# Patient Record
Sex: FEMALE | Race: WHITE | NOT HISPANIC OR LATINO | Employment: OTHER | URBAN - METROPOLITAN AREA
[De-identification: names, ages, dates, MRNs, and addresses within clinical notes are randomized per-mention and may not be internally consistent; named-entity substitution may affect disease eponyms.]

---

## 2017-01-19 ENCOUNTER — HOSPITAL ENCOUNTER (INPATIENT)
Facility: HOSPITAL | Age: 77
LOS: 2 days | Discharge: HOME | End: 2017-01-21
Attending: FAMILY MEDICINE | Admitting: INTERNAL MEDICINE

## 2017-01-19 ENCOUNTER — APPOINTMENT (OUTPATIENT)
Dept: GENERAL RADIOLOGY | Facility: HOSPITAL | Age: 77
End: 2017-01-19

## 2017-01-19 PROBLEM — I44.2 COMPLETE HEART BLOCK: Status: ACTIVE | Noted: 2017-01-19

## 2017-01-19 PROBLEM — J90 PLEURAL EFFUSION: Status: ACTIVE | Noted: 2017-01-19

## 2017-01-19 LAB — TROPONIN I SERPL-MCNC: <0.006 NG/ML

## 2017-01-19 PROCEDURE — 71020 HC CHEST PA AND LATERAL: CPT

## 2017-01-19 PROCEDURE — 84484 ASSAY OF TROPONIN QUANT: CPT | Performed by: FAMILY MEDICINE

## 2017-01-19 PROCEDURE — 99223 1ST HOSP IP/OBS HIGH 75: CPT | Performed by: FAMILY MEDICINE

## 2017-01-19 PROCEDURE — 93005 ELECTROCARDIOGRAM TRACING: CPT | Performed by: FAMILY MEDICINE

## 2017-01-19 RX ORDER — AMLODIPINE BESYLATE 2.5 MG/1
2.5 TABLET ORAL DAILY
Status: DISCONTINUED | OUTPATIENT
Start: 2017-01-20 | End: 2017-01-21 | Stop reason: HOSPADM

## 2017-01-19 RX ORDER — LATANOPROST 50 UG/ML
1 SOLUTION/ DROPS OPHTHALMIC NIGHTLY
COMMUNITY

## 2017-01-19 RX ORDER — ASPIRIN 81 MG/1
81 TABLET ORAL NIGHTLY
COMMUNITY

## 2017-01-19 RX ORDER — PANTOPRAZOLE SODIUM 40 MG/1
40 TABLET, DELAYED RELEASE ORAL
Status: DISCONTINUED | OUTPATIENT
Start: 2017-01-20 | End: 2017-01-21 | Stop reason: HOSPADM

## 2017-01-19 RX ORDER — ATORVASTATIN CALCIUM 20 MG/1
20 TABLET, FILM COATED ORAL DAILY
Status: DISCONTINUED | OUTPATIENT
Start: 2017-01-20 | End: 2017-01-20

## 2017-01-19 RX ORDER — LORAZEPAM 2 MG/ML
0.5 INJECTION INTRAMUSCULAR EVERY 6 HOURS PRN
Status: DISCONTINUED | OUTPATIENT
Start: 2017-01-19 | End: 2017-01-21 | Stop reason: HOSPADM

## 2017-01-19 RX ORDER — HYDRALAZINE HYDROCHLORIDE 20 MG/ML
10 INJECTION INTRAMUSCULAR; INTRAVENOUS EVERY 6 HOURS PRN
Status: DISCONTINUED | OUTPATIENT
Start: 2017-01-19 | End: 2017-01-21 | Stop reason: HOSPADM

## 2017-01-19 RX ORDER — SODIUM CHLORIDE 0.9 % (FLUSH) 0.9 %
1-10 SYRINGE (ML) INJECTION AS NEEDED
Status: DISCONTINUED | OUTPATIENT
Start: 2017-01-19 | End: 2017-01-21 | Stop reason: HOSPADM

## 2017-01-19 RX ORDER — ASPIRIN 81 MG/1
81 TABLET ORAL NIGHTLY
Status: DISCONTINUED | OUTPATIENT
Start: 2017-01-19 | End: 2017-01-21 | Stop reason: HOSPADM

## 2017-01-19 RX ORDER — LATANOPROST 50 UG/ML
1 SOLUTION/ DROPS OPHTHALMIC NIGHTLY
Status: DISCONTINUED | OUTPATIENT
Start: 2017-01-19 | End: 2017-01-21 | Stop reason: HOSPADM

## 2017-01-19 RX ORDER — LOSARTAN POTASSIUM 100 MG/1
100 TABLET ORAL DAILY
COMMUNITY
End: 2017-01-27 | Stop reason: ALTCHOICE

## 2017-01-19 RX ORDER — LEVOTHYROXINE SODIUM 88 UG/1
88 TABLET ORAL NIGHTLY
COMMUNITY
End: 2022-05-26 | Stop reason: ALTCHOICE

## 2017-01-19 RX ORDER — AMLODIPINE BESYLATE 2.5 MG/1
2.5 TABLET ORAL DAILY
COMMUNITY
End: 2017-01-27 | Stop reason: DRUGHIGH

## 2017-01-19 RX ORDER — ONDANSETRON 2 MG/ML
4 INJECTION INTRAMUSCULAR; INTRAVENOUS EVERY 6 HOURS PRN
Status: DISCONTINUED | OUTPATIENT
Start: 2017-01-19 | End: 2017-01-21 | Stop reason: HOSPADM

## 2017-01-19 RX ORDER — ACETAMINOPHEN 325 MG/1
650 TABLET ORAL EVERY 4 HOURS PRN
Status: DISCONTINUED | OUTPATIENT
Start: 2017-01-19 | End: 2017-01-21 | Stop reason: HOSPADM

## 2017-01-19 RX ORDER — HYDROCODONE BITARTRATE AND ACETAMINOPHEN 5; 325 MG/1; MG/1
1 TABLET ORAL EVERY 4 HOURS PRN
Status: DISCONTINUED | OUTPATIENT
Start: 2017-01-19 | End: 2017-01-21 | Stop reason: HOSPADM

## 2017-01-19 RX ORDER — SIMVASTATIN 40 MG
40 TABLET ORAL NIGHTLY
COMMUNITY

## 2017-01-19 RX ORDER — SODIUM CHLORIDE 9 MG/ML
100 INJECTION, SOLUTION INTRAVENOUS CONTINUOUS
Status: DISCONTINUED | OUTPATIENT
Start: 2017-01-19 | End: 2017-01-20

## 2017-01-19 RX ORDER — LOSARTAN POTASSIUM 50 MG/1
100 TABLET ORAL DAILY
Status: DISCONTINUED | OUTPATIENT
Start: 2017-01-20 | End: 2017-01-21 | Stop reason: HOSPADM

## 2017-01-19 RX ORDER — LEVOTHYROXINE SODIUM 88 UG/1
88 TABLET ORAL NIGHTLY
Status: DISCONTINUED | OUTPATIENT
Start: 2017-01-19 | End: 2017-01-21 | Stop reason: HOSPADM

## 2017-01-19 RX ADMIN — SODIUM CHLORIDE 100 ML/HR: 9 INJECTION, SOLUTION INTRAVENOUS at 21:58

## 2017-01-19 RX ADMIN — ASPIRIN 81 MG: 81 TABLET, COATED ORAL at 21:57

## 2017-01-19 RX ADMIN — LATANOPROST 1 DROP: 50 SOLUTION OPHTHALMIC at 21:57

## 2017-01-19 RX ADMIN — LEVOTHYROXINE SODIUM 88 MCG: 88 TABLET ORAL at 21:57

## 2017-01-19 NOTE — IP AVS SNAPSHOT
AFTER VISIT SUMMARY             Veronica Whatley           About your hospitalization     You were admitted on:  January 19, 2017 You last received care in the:  [address]       Procedures & Surgeries      Procedure(s) (LRB):  Pacemaker DC new (N/A)     1/19/2017 - 1/20/2017     Surgeon(s):  Ishan Melchor MD  -------------------      Medications    If you or your caregiver advised us that you are currently taking a medication and that medication is marked below as “Resume”, this simply indicates that we have reviewed those medications to make sure our new therapy recommendations do not interfere.  If you have concerns about medications other than those new ones which we are prescribing today, please consult the physician who prescribed them (or your primary physician).  Our review of your home medications is not meant to indicate that we are directing their use.             Your Medications      CONTINUE taking these medications     amitriptyline 50 MG tablet   Take 50 mg by mouth Every Night.   Commonly known as:  ELAVIL           amLODIPine 2.5 MG tablet   Take 2.5 mg by mouth Daily.   Last time this was given:  1/21/2017 10:04 AM   Commonly known as:  NORVASC           aspirin 81 MG EC tablet   Take 81 mg by mouth Every Night.   Last time this was given:  1/20/2017  8:12 PM           latanoprost 0.005 % ophthalmic solution   Administer 1 drop to both eyes Every Night.   Last time this was given:  1/20/2017  8:13 PM   Commonly known as:  XALATAN           levothyroxine 88 MCG tablet   Take 88 mcg by mouth Every Night.   Last time this was given:  1/21/2017 10:04 AM   Commonly known as:  SYNTHROID, LEVOTHROID           losartan 100 MG tablet   Take 100 mg by mouth Daily.   Last time this was given:  1/21/2017 12:55 PM   Commonly known as:  COZAAR           simvastatin 40 MG tablet   Take 40 mg by mouth Every Night.   Last time this was given:  1/20/2017  8:13 PM   Commonly known as:  ZOCOR                      Your Medications      Your Medication List           Morning Noon Evening Bedtime As Needed    amitriptyline 50 MG tablet   Take 50 mg by mouth Every Night.   Commonly known as:  ELAVIL                                   amLODIPine 2.5 MG tablet   Take 2.5 mg by mouth Daily.   Commonly known as:  NORVASC                                   aspirin 81 MG EC tablet   Take 81 mg by mouth Every Night.                                   latanoprost 0.005 % ophthalmic solution   Administer 1 drop to both eyes Every Night.   Commonly known as:  XALATAN                                   levothyroxine 88 MCG tablet   Take 88 mcg by mouth Every Night.   Commonly known as:  SYNTHROID, LEVOTHROID                                   losartan 100 MG tablet   Take 100 mg by mouth Daily.   Commonly known as:  COZAAR                                   simvastatin 40 MG tablet   Take 40 mg by mouth Every Night.   Commonly known as:  ZOCOR                                            Instructions for After Discharge        Discharge References/Attachments     THIRD DEGREE ATRIOVENTRICULAR BLOCK (ENGLISH)    PACEMAKER IMPLANTATION, CARE AFTER (ENGLISH)       Follow-ups for After Discharge        Referrals and Follow-ups to Schedule     Follow-Up    As directed    Needs wound check in 7-10 days with Dr Melchor           Additional information on Labs and Follow-ups:      Ishan Melchor  Cardiology  Cardiac Electrophysiology [phone] Phone [phone] Fax [phone] [phone]     Please call as soon as possible to make an appointment for a wound check in 7-10 days and a Pacemaker check in 10-12 weeks.              [employer] Signup     [facility] allows you to send messages to your doctor, view your test results, renew your prescriptions, schedule appointments, and more. To sign up, go to [employer] and click on the Sign Up Now link in the New User? box. Enter your  [email or web address] Activation Code exactly as it appears below along with the last four digits of your Social Security Number and your Date of Birth () to complete the sign-up process. If you do not sign up before the expiration date, you must request a new code.    [email or web address] Activation Code: [email or web address]  Expires: 2017 12:48 PM    If you have questions, you can email [email or web address] or call [phone] to talk to our [facility] staff. Remember, [employer] is NOT to be used for urgent needs. For medical emergencies, dial 911.           Summary of Your Hospitalization        Reason for Hospitalization     Your primary diagnosis was:  Complete Heart Block    Your diagnoses also included:  High Cholesterol Or Triglycerides, High Blood Pressure, Underactive Thyroid, Fluid In Pleural Cavity      Care Providers     Provider Service Role Specialty    Umm Galo II, DO Medicine Attending Provider Hospitalist    Ishan Melchor MD Cardiology Surgeon  Cardiology      Your Allergies  Date Reviewed: 2017    Allergen Reactions    Penicillins Rash    Rash and itching           Cantaloupe Extract Allergy Skin Test Not Noted         Cat Hair Extract Not Noted         Chicken Allergy Not Noted         Dog Epithelium Not Noted    hair         Eggs Or Egg-Derived Products Not Noted         Grass Not Noted         Horse-Derived Products Not Noted    Horse hair         Peanuts (Peanut Oil) Not Noted         Restoril (Temazepam) Hallucinations         Watermelon (Citrullus Vulgaris) Not Noted         Sulfa Antibiotics Rash      Pending Labs     Order Current Status    Urine Culture Preliminary result      Patient Belongings Returned     Document Return of Belongings Flowsheet     Were the patient bedside belongings sent home?   --   Belongings Retrieved from Security & Sent Home   --    Belongings Sent to Safe   --   Medications Retrieved from Pharmacy & Sent Home   --              More Information      Third-Degree  Atrioventricular Block  Third-degree atrioventricular (AV) block is a type of heart block.  About Heart Block  Heart block is a problem with the system that controls how often the heart beats (heart rate). If you have heart block, the electrical signals that regulate your heart rate are slowed or interrupted.  Normal Heart Action  The heart has two upper chambers (atria) and two lower chambers (ventricles). They work together to pump blood to the body. The heartbeat starts in an upper area of the right atrium (sinoatrial node, or SA node). This is the heart's natural pacemaker. The SA node sends electrical signals that pass through another node (atrioventricular node, or AV node), which is located between the atria and ventricles. Next, the signals travel through the ventricles on conduction pathways. As the signals pass down these pathways, the ventricles contract and send blood out to the body.  About Third-Degree AV Block  Third-degree heart block is the most serious type of AV block. Signals that control your heart rate are completely blocked. If you have third-degree AV block, the signals do not reach your ventricles. Your ventricles must contract on their own (escape beats). Escape beats are not enough to keep your heart working well. A slow heart rate may require an artificial pacemaker.  CAUSES  In some cases, a person is born with heart block. More often, the condition develops over time. Third-degree heart block may be caused by:  · Any condition that damages the heart's conducting system.  · Some medicines that slow down the heart rate.  RISK FACTORS  The risk for this condition increases with age. It is also more likely to develop in people who have any of the following conditions:  · A heart attack in the past.  · Heart failure.  · Coronary heart disease.  · Inflammation of heart muscle (myocarditis).  · Disease of heart muscle (cardiomyopathy).  · Infection of the heart valves  (endocarditis).  · Infections or diseases that affect the heart. These include:    Lyme disease.    Sarcoidosis.    Hemochromatosis.    Rheumatic fever.  SYMPTOMS  Symptoms of this condition may include:  · Fatigue.  · Shortness of breath.  · Dizziness or light-headedness.  · Fainting.  · Chest pain.  DIAGNOSIS  This condition may be diagnosed with:  · A physical exam. Your health care provider may determine that you have a very slow and irregular heartbeat or pulse.  · An electrocardiogram (ECG). This test is used to check for problems with the electrical activity in your heart.  TREATMENT  This condition requires immediate treatment in a hospital. Treatment may include:  · Continuous heart rhythm monitoring.  · Pacemaker placement. A pacemaker is a small, battery-powered device that is placed under the skin and is programmed to sense your heartbeats. If your heart rate is lower than the programmed rate, the pacemaker will pace your heart.    You may have a temporary pacemaker until a permanent pacemaker can be placed.  · Stopping or changing heart medicines that can slow your heart rate.  · Treating any medical conditions that may cause heart block.  HOME CARE INSTRUCTIONS  · If you get a permanent pacemaker, follow instructions from your health care provider about caring for and living with the device.  · Take over-the-counter and prescription medicines only as told by your health care provider.  · Work with your health care providers to control all your risks for heart disease. This may include following these instructions:    Get regular exercise. Ask your health care provider what type of exercise is safe for you.    Eat a heart-healthy diet. Your health care provider or dietitian can help you make healthy choices.    Maintain a healthy weight.    Do not use any tobacco products, including cigarettes, chewing tobacco, or e-cigarettes. If you need help quitting, ask your health care provider.    Limit alcohol  intake to no more than 1 drink per day for nonpregnant women and 2 drinks per day for men. One drink equals 12 oz of beer, 5 oz of wine, or 1½ oz of hard liquor.  · Keep all follow-up visits as told by your health care provider. This is important.  SEEK MEDICAL CARE IF:  · Your symptoms change or get worse.  · Your feel as though your heart is skipping beats.  · You feel more tired than normal.  · You have swelling in your lower legs.  SEEK IMMEDIATE MEDICAL CARE IF:  · You have chest pain, especially if the pain:    Feels like crushing or pressure.    Spreads to your arms, back, neck, or jaw.  · You feel short of breath.  · You feel light-headed or weak.  · You faint.     This information is not intended to replace advice given to you by your health care provider. Make sure you discuss any questions you have with your health care provider.     Document Released: 11/30/2009 Document Revised: 05/03/2016 Document Reviewed: 11/18/2015  [employer] Interactive Patient Education ©2016 [employer] Inc[employer]          Pacemaker Implantation, Care After  Refer to this sheet over the next few weeks. These instructions provide you with information on caring for yourself after the procedure. Your health care provider may also give you more specific instructions. Your treatment has been planned according to current medical practices, but problems sometimes occur. Call your health care provider if you have any problems or questions regarding your pacemaker.   WHAT TO EXPECT AFTER THE PROCEDURE  · You may feel pain. Some pain is normal. It may last a few days.  · A slight bump may be seen over the skin where the device was placed. Sometimes, it is possible to feel the device under the skin. This is normal.  · In the months and years afterward, your health care provider will check the device, the leads, and the battery every few months. Eventually, when the battery is low, the device will be replaced.  HOME CARE  INSTRUCTIONS  Medicines  · Take medicines only as directed by your health care provider.  · If you were prescribed an antibiotic medicine, finish it all even if you start to feel better.  · Do not take any other medicines without asking your health care provider first. Some medicines, including certain painkillers, can cause bleeding in your stomach after surgery.  Wound Care  · Do not remove the bandage on your chest until directed to do so by your health care provider.  · After your bandage is removed, you may see pieces of tape called skin adhesive strips over the area where the cut was made (incision site). Let them fall off on their own.  · Check the incision site every day to make sure it is not infected, bleeding, or starting to pull apart.  · Do not use lotions or ointments near the incision site unless directed to do so.  · Keep the incision area clean and dry for 2-3 days after the procedure or as directed by your health care provider. It takes several weeks for the incision site to completely heal.  · Do not take baths, swim, or use a hot tub until your health care provider approves.  Activities  · Try to walk a little every day. Exercising is important after this procedure. It is also important to use your shoulder on the side of the pacemaker in daily tasks that do not require exaggerated motion.  · Avoid sudden jerking, pulling, or chopping movements that pull your upper arm far away from your body for at least 6 weeks.  · Do not lift your upper arm above your shoulders for at least 6 weeks. This means no tennis, golf, or swimming for this period of time. If you sleep with the arm above your head, use a restraint to prevent this from happening as you sleep.  · You may go back to work when your health care provider says it is okay. Check with your health care provider before you start to drive or play sports.  Other Instructions  · Follow diet instructions if they were provided. You should be able to  eat what you usually do right away, but you may need to limit your salt intake.  · Weigh yourself every day. If you suddenly gain weight, fluid may be building up in your body.  · Always carry your pacemaker identification card with you. The card should list the implant date, device model, and . Consider wearing a medical alert bracelet or necklace.  · Tell all health care providers that you have a pacemaker. This may prevent them from giving you a magnetic resource imaging scan (MRI) because of the strong magnets used during that test.  · If you must pass through a metal detector, quickly walk through it. Do not stop under the detector or stand near it.  · Avoid places or objects with a strong electric or magnetic field, including:  ¨ Airport security aparicio. When at the airport, let officials know you have a pacemaker. Your ID card will let you be checked in a way that is safe for you and that will not damage your pacemaker. Also, do not let a security person wave a magnetic wand near your pacemaker. That can make it stop working.  ¨ Power plants.  ¨ Large electrical generators.  ¨ Radiofrequency transmission towers, such as cell phone and radio towers.  · Do not use amateur (ham) radio equipment or electric (arc) welding torches. Some devices are safe to use if held at least 1 foot from your pacemaker. These include power tools, lawn mowers, and speakers. If you are unsure of whether something is safe to use, ask your health care provider.  · You may safely use electric blankets, heating pads, computers, and microwave ovens.  · When using your cell phone, hold it to the ear opposite the pacemaker. Do not leave your cell phone in a pocket over the pacemaker.  · Keep all follow-up visits as directed by your health care provider. This is how your health care provider makes sure your chest is healing the way it should. Ask your health care provider when you should come back to have your stitches or staples  taken out.  · Have your pacemaker checked every 3-6 months or as directed by your health care provider. Most pacemakers last for 4-8 years before a new one is needed.  SEEK MEDICAL CARE IF:  · You gain weight suddenly.  · Your legs or feet swell more than they have before.  · It feels like your heart is fluttering or skipping beats (heart palpitations).  · You have a fever.  SEEK IMMEDIATE MEDICAL CARE IF:  · You have chest pain.  · You feel more short of breath than you have felt before.  · You feel more light-headed than you have felt before.  · You have problems with your incision site, such as swelling or bleeding, or it starts to open up.  · You have drainage, redness, swelling, or pain at your incision site.     This information is not intended to replace advice given to you by your health care provider. Make sure you discuss any questions you have with your health care provider.     Document Released: 07/07/2006 Document Revised: 01/08/2016 Document Reviewed: 04/19/2013  [employer] Interactive Patient Education ©2016 [employer].         PREVENTING SURGICAL SITE INFECTIONS     Surgical Site Infections FAQs  What is a Surgical Site Infection (SSI)?  A surgical site infection is an infection that occurs after surgery in the part of the body where the surgery took place. Most patients who have surgery do not develop an infection. However, infections develop in about 1 to 3 out of every 100 patients who have surgery.  Some of the common symptoms of a surgical site infection are:  · Redness and pain around the area where you had surgery  · Drainage of cloudy fluid from your surgical wound  · Fever  Can SSIs be treated?  Yes. Most surgical site infections can be treated with antibiotics. The antibiotic given to you depends on the bacteria (germs) causing the infection. Sometimes patients with SSIs also need another surgery to treat the infection.  What are some of the things that hospitals are doing to prevent  SSIs?  To prevent SSIs, doctors, nurses, and other healthcare providers:  · Clean their hands and arms up to their elbows with an antiseptic agent just before the surgery.  · Clean their hands with soap and water or an alcohol-based hand rub before and after caring for each patient.  · May remove some of your hair immediately before your surgery using electric clippers if the hair is in the same area where the procedure will occur. They should not shave you with a razor.  · Wear special hair covers, masks, gowns, and gloves during surgery to keep the surgery area clean.  · Give you antibiotics before your surgery starts. In most cases, you should get antibiotics within 60 minutes before the surgery starts and the antibiotics should be stopped within 24 hours after surgery.  · Clean the skin at the site of your surgery with a special soap that kills germs.  What can I do to help prevent SSIs?  Before your surgery:  · Tell your doctor about other medical problems you may have. Health problems such as allergies, diabetes, and obesity could affect your surgery and your treatment.  · Quit smoking. Patients who smoke get more infections. Talk to your doctor about how you can quit before your surgery.  · Do not shave near where you will have surgery. Shaving with a razor can irritate your skin and make it easier to develop an infection.  At the time of your surgery:  · Speak up if someone tries to shave you with a razor before surgery. Ask why you need to be shaved and talk with your surgeon if you have any concerns.  · Ask if you will get antibiotics before surgery.  After your surgery:  · Make sure that your healthcare providers clean their hands before examining you, either with soap and water or an alcohol-based hand rub.    If you do not see your providers clean their hands, please ask them to do so.  · Family and friends who visit you should not touch the surgical wound or dressings.  · Family and friends should  clean their hands with soap and water or an alcohol-based hand rub before and after visiting you. If you do not see them clean their hands, ask them to clean their hands.  What do I need to do when I go home from the hospital?  · Before you go home, your doctor or nurse should explain everything you need to know about taking care of your wound. Make sure you understand how to care for your wound before you leave the hospital.  · Always clean your hands before and after caring for your wound.  · Before you go home, make sure you know who to contact if you have questions or problems after you get home.  · If you have any symptoms of an infection, such as redness and pain at the surgery site, drainage, or fever, call your doctor immediately.  If you have additional questions, please ask your doctor or nurse.  Developed and co-sponsored by The Society for Healthcare Epidemiology of [place] (SHEA); Infectious Diseases Society of [place] (IDSA); American Hospital Association; Association for Professionals in Infection Control and Epidemiology (APIC); Centers for Disease Control and Prevention (CDC); and The Joint Commission.     This information is not intended to replace advice given to you by your health care provider. Make sure you discuss any questions you have with your health care provider.     Document Released: 12/23/2014 Document Revised: 01/08/2016 Document Reviewed: 03/02/2016  [employer] Interactive Patient Education ©2016 [employer]             SYMPTOMS OF A STROKE    Call 911 or have someone take you to the Emergency Department if you have any of the following:    · Sudden numbness or weakness of your face, arm or leg especially on one side of the body  · Sudden confusion, diffiiculty speaking or trouble understanding   · Changes in your vision or loss of sight in one eye  · Sudden severe headache with no known cause  · sudden dizziness, trouble walking, loss of balance or coordination    It is important to  seek emergency care right away if you have further stroke symptoms. If you get emergency help quickly, the powerful clot-dissolving medicines can reduce the disabilities caused by a stroke.     For more information:    American Stroke Association  1-181-7-STROKE  www.strokeassociation.org           IF YOU SMOKE OR USE TOBACCO PLEASE READ THE FOLLOWING:    Why is smoking bad for me?  Smoking increases the risk of heart disease, lung disease, vascular disease, stroke, and cancer.     If you smoke, STOP!    If you would like more information on quitting smoking, please visit the [employer] website: [email or web address]   This link will provide additional resources including the QUIT line and the Beat the Pack support groups.     For more information:    American Cancer Society  ([phone] 897-1679    American Heart Association  1-953.238.8060               YOU ARE THE MOST IMPORTANT FACTOR IN YOUR RECOVERY.     Follow all instructions carefully.     I have reviewed my discharge instructions with my nurse, including the following information, if applicable:     Information about my illness and diagnosis   Follow up appointments (including lab draws)   Wound Care   Equipment Needs   Medications (new and continuing) along with side effects   Preventative information such as vaccines and smoking cessations   Diet   Pain   I know when to contact my Doctor's office or seek emergency care      I want my nurse to describe the side effects of my medications: YES NO   If the answer is no, I understand the side effects of my medications: YES NO   My nurse described the side effects of my medications in a way that I could understand: YES NO   I have taken my personal belongings and my own medications with me at discharge: YES NO            I have received this information and my questions have been answered. I have discussed any concerns I see with this plan with the nurse or physician. I  understand these instructions.    Signature of Patient or Responsible Person: _____________________________________    Date: _________________  Time: __________________    Signature of Healthcare Provider: _______________________________________  Date: _________________  Time: __________________

## 2017-01-20 ENCOUNTER — APPOINTMENT (OUTPATIENT)
Dept: CARDIOLOGY | Facility: HOSPITAL | Age: 77
End: 2017-01-20
Attending: FAMILY MEDICINE

## 2017-01-20 LAB
ANION GAP SERPL CALCULATED.3IONS-SCNC: 8 MMOL/L (ref 3–11)
ARTICHOKE IGE QN: 52 MG/DL (ref 0–130)
BACTERIA UR QL AUTO: ABNORMAL /HPF
BASOPHILS # BLD AUTO: 0.04 10*3/MM3 (ref 0–0.2)
BASOPHILS NFR BLD AUTO: 0.6 % (ref 0–1)
BH CV ECHO MEAS - AO MAX PG (FULL): 2.3 MMHG
BH CV ECHO MEAS - AO MAX PG: 9.2 MMHG
BH CV ECHO MEAS - AO MEAN PG (FULL): 1.8 MMHG
BH CV ECHO MEAS - AO MEAN PG: 4.5 MMHG
BH CV ECHO MEAS - AO ROOT AREA (BSA CORRECTED): 1.3
BH CV ECHO MEAS - AO ROOT AREA: 4.3 CM^2
BH CV ECHO MEAS - AO ROOT DIAM: 2.3 CM
BH CV ECHO MEAS - AO V2 MAX: 152 CM/SEC
BH CV ECHO MEAS - AO V2 MEAN: 98 CM/SEC
BH CV ECHO MEAS - AO V2 VTI: 36.8 CM
BH CV ECHO MEAS - AVA(I,A): 2.4 CM^2
BH CV ECHO MEAS - AVA(I,D): 2.4 CM^2
BH CV ECHO MEAS - AVA(V,A): 2.5 CM^2
BH CV ECHO MEAS - AVA(V,D): 2.5 CM^2
BH CV ECHO MEAS - BSA(HAYCOCK): 1.8 M^2
BH CV ECHO MEAS - BSA(HAYCOCK): 1.8 M^2
BH CV ECHO MEAS - BSA: 1.7 M^2
BH CV ECHO MEAS - BSA: 1.7 M^2
BH CV ECHO MEAS - BZI_BMI: 31.4 KILOGRAMS/M^2
BH CV ECHO MEAS - BZI_BMI: 31.4 KILOGRAMS/M^2
BH CV ECHO MEAS - BZI_METRIC_HEIGHT: 154.9 CM
BH CV ECHO MEAS - BZI_METRIC_HEIGHT: 154.9 CM
BH CV ECHO MEAS - BZI_METRIC_WEIGHT: 75.3 KG
BH CV ECHO MEAS - BZI_METRIC_WEIGHT: 75.3 KG
BH CV ECHO MEAS - CONTRAST EF (2CH): 67.1 ML/M^2
BH CV ECHO MEAS - CONTRAST EF 4CH: 60.6 ML/M^2
BH CV ECHO MEAS - EDV(CUBED): 133.8 ML
BH CV ECHO MEAS - EDV(MOD-SP2): 76 ML
BH CV ECHO MEAS - EDV(MOD-SP4): 71 ML
BH CV ECHO MEAS - EDV(TEICH): 124.7 ML
BH CV ECHO MEAS - EF(CUBED): 68.4 %
BH CV ECHO MEAS - EF(MOD-SP2): 67.1 %
BH CV ECHO MEAS - EF(MOD-SP4): 60.6 %
BH CV ECHO MEAS - EF(TEICH): 59.6 %
BH CV ECHO MEAS - ESV(CUBED): 42.3 ML
BH CV ECHO MEAS - ESV(MOD-SP2): 25 ML
BH CV ECHO MEAS - ESV(MOD-SP4): 28 ML
BH CV ECHO MEAS - ESV(TEICH): 50.4 ML
BH CV ECHO MEAS - FS: 31.9 %
BH CV ECHO MEAS - IVS/LVPW: 1.1
BH CV ECHO MEAS - IVSD: 0.79 CM
BH CV ECHO MEAS - LA DIMENSION: 3.3 CM
BH CV ECHO MEAS - LA/AO: 1.4
BH CV ECHO MEAS - LAT PEAK E' VEL: 10.3 CM/SEC
BH CV ECHO MEAS - LV DIASTOLIC VOL/BSA (35-75): 40.7 ML/M^2
BH CV ECHO MEAS - LV MASS(C)D: 133.7 GRAMS
BH CV ECHO MEAS - LV MASS(C)DI: 76.6 GRAMS/M^2
BH CV ECHO MEAS - LV MAX PG: 7 MMHG
BH CV ECHO MEAS - LV MEAN PG: 2.7 MMHG
BH CV ECHO MEAS - LV SYSTOLIC VOL/BSA (12-30): 16 ML/M^2
BH CV ECHO MEAS - LV V1 MAX: 132.2 CM/SEC
BH CV ECHO MEAS - LV V1 MEAN: 74 CM/SEC
BH CV ECHO MEAS - LV V1 VTI: 31.3 CM
BH CV ECHO MEAS - LVIDD: 5.1 CM
BH CV ECHO MEAS - LVIDS: 3.5 CM
BH CV ECHO MEAS - LVLD AP2: 6.2 CM
BH CV ECHO MEAS - LVLD AP4: 7.5 CM
BH CV ECHO MEAS - LVLS AP2: 5.4 CM
BH CV ECHO MEAS - LVLS AP4: 6.1 CM
BH CV ECHO MEAS - LVOT AREA (M): 2.8 CM^2
BH CV ECHO MEAS - LVOT AREA: 2.9 CM^2
BH CV ECHO MEAS - LVOT DIAM: 1.9 CM
BH CV ECHO MEAS - LVPWD: 0.74 CM
BH CV ECHO MEAS - MED PEAK E' VEL: 7.41 CM/SEC
BH CV ECHO MEAS - MV A MAX VEL: 120.7 CM/SEC
BH CV ECHO MEAS - MV DEC TIME: 0.16 SEC
BH CV ECHO MEAS - MV E MAX VEL: 94.3 CM/SEC
BH CV ECHO MEAS - MV E/A: 0.78
BH CV ECHO MEAS - PA ACC SLOPE: 1175 CM/SEC^2
BH CV ECHO MEAS - PA ACC TIME: 0.07 SEC
BH CV ECHO MEAS - PA MAX PG: 3 MMHG
BH CV ECHO MEAS - PA PR(ACCEL): 45.7 MMHG
BH CV ECHO MEAS - PA V2 MAX: 86.4 CM/SEC
BH CV ECHO MEAS - PULM DIAS VEL: 72.6 CM/SEC
BH CV ECHO MEAS - PULM S/D: 0.95
BH CV ECHO MEAS - PULM SYS VEL: 68.8 CM/SEC
BH CV ECHO MEAS - RVDD: 3.4 CM
BH CV ECHO MEAS - SI(AO): 90.5 ML/M^2
BH CV ECHO MEAS - SI(CUBED): 52.4 ML/M^2
BH CV ECHO MEAS - SI(LVOT): 51.5 ML/M^2
BH CV ECHO MEAS - SI(MOD-SP2): 29.2 ML/M^2
BH CV ECHO MEAS - SI(MOD-SP4): 24.6 ML/M^2
BH CV ECHO MEAS - SI(TEICH): 42.6 ML/M^2
BH CV ECHO MEAS - SV(AO): 157.9 ML
BH CV ECHO MEAS - SV(CUBED): 91.5 ML
BH CV ECHO MEAS - SV(LVOT): 89.9 ML
BH CV ECHO MEAS - SV(MOD-SP2): 51 ML
BH CV ECHO MEAS - SV(MOD-SP4): 43 ML
BH CV ECHO MEAS - SV(TEICH): 74.3 ML
BH CV ECHO MEAS - TR MAX VEL: 284 CM/SEC
BH CV XLRA - RV BASE: 3.2 CM
BH CV XLRA - RV LENGTH: 6.9 CM
BH CV XLRA - RV MID: 3.4 CM
BH CV XLRA MEAS LEFT CCA RATIO VEL: 127 CM/SEC
BH CV XLRA MEAS LEFT DIST CCA EDV: 28.6 CM/SEC
BH CV XLRA MEAS LEFT DIST CCA PSV: 127.8 CM/SEC
BH CV XLRA MEAS LEFT DIST ICA EDV: 27.9 CM/SEC
BH CV XLRA MEAS LEFT DIST ICA PSV: 127 CM/SEC
BH CV XLRA MEAS LEFT ICA RATIO VEL: 93.7 CM/SEC
BH CV XLRA MEAS LEFT ICA/CCA RATIO: 0.74
BH CV XLRA MEAS LEFT MID CCA PSV: 69.1 CM/SEC
BH CV XLRA MEAS LEFT MID ICA EDV: 23.3 CM/SEC
BH CV XLRA MEAS LEFT MID ICA PSV: 94.3 CM/SEC
BH CV XLRA MEAS LEFT PROX CCA EDV: 14.5 CM/SEC
BH CV XLRA MEAS LEFT PROX CCA PSV: 72.3 CM/SEC
BH CV XLRA MEAS LEFT PROX ECA PSV: 130.1 CM/SEC
BH CV XLRA MEAS LEFT PROX ICA EDV: 17.6 CM/SEC
BH CV XLRA MEAS LEFT PROX ICA PSV: 81.1 CM/SEC
BH CV XLRA MEAS LEFT PROX SCLA PSV: 295.4 CM/SEC
BH CV XLRA MEAS LEFT VERTEBRAL A EDV: 14.7 CM/SEC
BH CV XLRA MEAS LEFT VERTEBRAL A PSV: 93.6 CM/SEC
BH CV XLRA MEAS RIGHT CCA RATIO VEL: 88.6 CM/SEC
BH CV XLRA MEAS RIGHT DIST CCA EDV: 14.5 CM/SEC
BH CV XLRA MEAS RIGHT DIST CCA PSV: 89.3 CM/SEC
BH CV XLRA MEAS RIGHT DIST ICA EDV: 18.9 CM/SEC
BH CV XLRA MEAS RIGHT DIST ICA PSV: 81.7 CM/SEC
BH CV XLRA MEAS RIGHT ICA RATIO VEL: 111 CM/SEC
BH CV XLRA MEAS RIGHT ICA/CCA RATIO: 1.3
BH CV XLRA MEAS RIGHT MID ICA EDV: 20.1 CM/SEC
BH CV XLRA MEAS RIGHT MID ICA PSV: 91.1 CM/SEC
BH CV XLRA MEAS RIGHT PROX CCA EDV: 15.7 CM/SEC
BH CV XLRA MEAS RIGHT PROX CCA PSV: 77.3 CM/SEC
BH CV XLRA MEAS RIGHT PROX ECA PSV: 118.2 CM/SEC
BH CV XLRA MEAS RIGHT PROX ICA EDV: 20.7 CM/SEC
BH CV XLRA MEAS RIGHT PROX ICA PSV: 111.3 CM/SEC
BH CV XLRA MEAS RIGHT PROX SCLA PSV: 238.9 CM/SEC
BH CV XLRA MEAS RIGHT VERTEBRAL A EDV: 14.5 CM/SEC
BH CV XLRA MEAS RIGHT VERTEBRAL A PSV: 92.4 CM/SEC
BILIRUB UR QL STRIP: NEGATIVE
BNP SERPL-MCNC: 94 PG/ML (ref 0–100)
BUN BLD-MCNC: 12 MG/DL (ref 9–23)
BUN/CREAT SERPL: 17.1 (ref 7–25)
CALCIUM SPEC-SCNC: 9.3 MG/DL (ref 8.7–10.4)
CHLORIDE SERPL-SCNC: 109 MMOL/L (ref 99–109)
CHOLEST SERPL-MCNC: 109 MG/DL (ref 0–200)
CLARITY UR: CLEAR
CO2 SERPL-SCNC: 30 MMOL/L (ref 20–31)
COLOR UR: YELLOW
CREAT BLD-MCNC: 0.7 MG/DL (ref 0.6–1.3)
DEPRECATED RDW RBC AUTO: 46.7 FL (ref 37–54)
E/E' RATIO: 11.2
EOSINOPHIL # BLD AUTO: 0.48 10*3/MM3 (ref 0.1–0.3)
EOSINOPHIL NFR BLD AUTO: 7.1 % (ref 0–3)
ERYTHROCYTE [DISTWIDTH] IN BLOOD BY AUTOMATED COUNT: 13.9 % (ref 11.3–14.5)
GFR SERPL CREATININE-BSD FRML MDRD: 82 ML/MIN/1.73
GLUCOSE BLD-MCNC: 87 MG/DL (ref 70–100)
GLUCOSE UR STRIP-MCNC: NEGATIVE MG/DL
HBA1C MFR BLD: 5.7 % (ref 4.8–5.6)
HCT VFR BLD AUTO: 38.6 % (ref 34.5–44)
HDLC SERPL-MCNC: 37 MG/DL (ref 40–60)
HGB BLD-MCNC: 12.5 G/DL (ref 11.5–15.5)
HGB UR QL STRIP.AUTO: NEGATIVE
HYALINE CASTS UR QL AUTO: ABNORMAL /LPF
IMM GRANULOCYTES # BLD: 0.01 10*3/MM3 (ref 0–0.03)
IMM GRANULOCYTES NFR BLD: 0.1 % (ref 0–0.6)
KETONES UR QL STRIP: NEGATIVE
LEFT ATRIUM VOLUME INDEX: 19.4 ML/M2
LEUKOCYTE ESTERASE UR QL STRIP.AUTO: ABNORMAL
LV EF 2D ECHO EST: 60 %
LYMPHOCYTES # BLD AUTO: 2.55 10*3/MM3 (ref 0.6–4.8)
LYMPHOCYTES NFR BLD AUTO: 37.7 % (ref 24–44)
MAGNESIUM SERPL-MCNC: 1.9 MG/DL (ref 1.3–2.7)
MCH RBC QN AUTO: 29.8 PG (ref 27–31)
MCHC RBC AUTO-ENTMCNC: 32.4 G/DL (ref 32–36)
MCV RBC AUTO: 92.1 FL (ref 80–99)
MONOCYTES # BLD AUTO: 0.7 10*3/MM3 (ref 0–1)
MONOCYTES NFR BLD AUTO: 10.3 % (ref 0–12)
NEUTROPHILS # BLD AUTO: 2.99 10*3/MM3 (ref 1.5–8.3)
NEUTROPHILS NFR BLD AUTO: 44.2 % (ref 41–71)
NITRITE UR QL STRIP: NEGATIVE
PH UR STRIP.AUTO: 7 [PH] (ref 5–8)
PLATELET # BLD AUTO: 265 10*3/MM3 (ref 150–450)
PMV BLD AUTO: 10.5 FL (ref 6–12)
POTASSIUM BLD-SCNC: 4.6 MMOL/L (ref 3.5–5.5)
PROT UR QL STRIP: NEGATIVE
RBC # BLD AUTO: 4.19 10*6/MM3 (ref 3.89–5.14)
RBC # UR: ABNORMAL /HPF
REF LAB TEST METHOD: ABNORMAL
SODIUM BLD-SCNC: 147 MMOL/L (ref 132–146)
SP GR UR STRIP: 1.01 (ref 1–1.03)
SQUAMOUS #/AREA URNS HPF: ABNORMAL /HPF
T4 FREE SERPL-MCNC: 1.41 NG/DL (ref 0.89–1.76)
TRANS CELLS #/AREA URNS HPF: ABNORMAL /HPF
TRIGL SERPL-MCNC: 132 MG/DL (ref 0–150)
TROPONIN I SERPL-MCNC: <0.006 NG/ML
TROPONIN I SERPL-MCNC: <0.006 NG/ML
TSH SERPL DL<=0.05 MIU/L-ACNC: 1.4 MIU/ML (ref 0.35–5.35)
UROBILINOGEN UR QL STRIP: ABNORMAL
WBC NRBC COR # BLD: 6.77 10*3/MM3 (ref 3.5–10.8)
WBC UR QL AUTO: ABNORMAL /HPF

## 2017-01-20 PROCEDURE — 93880 EXTRACRANIAL BILAT STUDY: CPT

## 2017-01-20 PROCEDURE — 80061 LIPID PANEL: CPT | Performed by: FAMILY MEDICINE

## 2017-01-20 PROCEDURE — A4565 SLINGS: HCPCS | Performed by: INTERNAL MEDICINE

## 2017-01-20 PROCEDURE — 02HK3JZ INSERTION OF PACEMAKER LEAD INTO RIGHT VENTRICLE, PERCUTANEOUS APPROACH: ICD-10-PCS | Performed by: INTERNAL MEDICINE

## 2017-01-20 PROCEDURE — 25010000002 ONDANSETRON PER 1 MG: Performed by: INTERNAL MEDICINE

## 2017-01-20 PROCEDURE — 93306 TTE W/DOPPLER COMPLETE: CPT | Performed by: INTERNAL MEDICINE

## 2017-01-20 PROCEDURE — 02H63JZ INSERTION OF PACEMAKER LEAD INTO RIGHT ATRIUM, PERCUTANEOUS APPROACH: ICD-10-PCS | Performed by: INTERNAL MEDICINE

## 2017-01-20 PROCEDURE — 83735 ASSAY OF MAGNESIUM: CPT | Performed by: FAMILY MEDICINE

## 2017-01-20 PROCEDURE — C1785 PMKR, DUAL, RATE-RESP: HCPCS | Performed by: INTERNAL MEDICINE

## 2017-01-20 PROCEDURE — 33208 INSRT HEART PM ATRIAL & VENT: CPT | Performed by: INTERNAL MEDICINE

## 2017-01-20 PROCEDURE — 99233 SBSQ HOSP IP/OBS HIGH 50: CPT | Performed by: INTERNAL MEDICINE

## 2017-01-20 PROCEDURE — 84484 ASSAY OF TROPONIN QUANT: CPT | Performed by: FAMILY MEDICINE

## 2017-01-20 PROCEDURE — C1898 LEAD, PMKR, OTHER THAN TRANS: HCPCS | Performed by: INTERNAL MEDICINE

## 2017-01-20 PROCEDURE — 25010000002 FENTANYL CITRATE (PF) 100 MCG/2ML SOLUTION: Performed by: INTERNAL MEDICINE

## 2017-01-20 PROCEDURE — 93880 EXTRACRANIAL BILAT STUDY: CPT | Performed by: INTERNAL MEDICINE

## 2017-01-20 PROCEDURE — C1892 INTRO/SHEATH,FIXED,PEEL-AWAY: HCPCS | Performed by: INTERNAL MEDICINE

## 2017-01-20 PROCEDURE — 83880 ASSAY OF NATRIURETIC PEPTIDE: CPT | Performed by: FAMILY MEDICINE

## 2017-01-20 PROCEDURE — 25010000002 MIDAZOLAM PER 1 MG: Performed by: INTERNAL MEDICINE

## 2017-01-20 PROCEDURE — 25010000002 VANCOMYCIN HCL IN NACL 1.25-0.9 GM/250ML-% SOLUTION: Performed by: INTERNAL MEDICINE

## 2017-01-20 PROCEDURE — 99153 MOD SED SAME PHYS/QHP EA: CPT | Performed by: INTERNAL MEDICINE

## 2017-01-20 PROCEDURE — 99152 MOD SED SAME PHYS/QHP 5/>YRS: CPT | Performed by: INTERNAL MEDICINE

## 2017-01-20 PROCEDURE — 25010000002 HEPARIN (PORCINE) PER 1000 UNITS: Performed by: INTERNAL MEDICINE

## 2017-01-20 PROCEDURE — 84443 ASSAY THYROID STIM HORMONE: CPT | Performed by: FAMILY MEDICINE

## 2017-01-20 PROCEDURE — 80048 BASIC METABOLIC PNL TOTAL CA: CPT | Performed by: FAMILY MEDICINE

## 2017-01-20 PROCEDURE — 81001 URINALYSIS AUTO W/SCOPE: CPT | Performed by: FAMILY MEDICINE

## 2017-01-20 PROCEDURE — 84439 ASSAY OF FREE THYROXINE: CPT | Performed by: FAMILY MEDICINE

## 2017-01-20 PROCEDURE — 93306 TTE W/DOPPLER COMPLETE: CPT

## 2017-01-20 PROCEDURE — 0JH606Z INSERTION OF PACEMAKER, DUAL CHAMBER INTO CHEST SUBCUTANEOUS TISSUE AND FASCIA, OPEN APPROACH: ICD-10-PCS | Performed by: INTERNAL MEDICINE

## 2017-01-20 PROCEDURE — 83036 HEMOGLOBIN GLYCOSYLATED A1C: CPT | Performed by: FAMILY MEDICINE

## 2017-01-20 PROCEDURE — 85025 COMPLETE CBC W/AUTO DIFF WBC: CPT | Performed by: FAMILY MEDICINE

## 2017-01-20 PROCEDURE — 87086 URINE CULTURE/COLONY COUNT: CPT | Performed by: FAMILY MEDICINE

## 2017-01-20 DEVICE — LD PM TENDRIL STS 6F52CM 2088TC52: Type: IMPLANTABLE DEVICE | Status: FUNCTIONAL

## 2017-01-20 DEVICE — GEN PM ASSURITY DR RF PM2240 MERLIN: Type: IMPLANTABLE DEVICE | Status: FUNCTIONAL

## 2017-01-20 DEVICE — LD PM TENDRIL STS 6F58CM 2088TC58: Type: IMPLANTABLE DEVICE | Status: FUNCTIONAL

## 2017-01-20 RX ORDER — SODIUM CHLORIDE 0.9 % (FLUSH) 0.9 %
1-10 SYRINGE (ML) INJECTION AS NEEDED
Status: DISCONTINUED | OUTPATIENT
Start: 2017-01-20 | End: 2017-01-21 | Stop reason: HOSPADM

## 2017-01-20 RX ORDER — ACETAMINOPHEN 325 MG/1
650 TABLET ORAL EVERY 4 HOURS PRN
Status: DISCONTINUED | OUTPATIENT
Start: 2017-01-20 | End: 2017-01-21 | Stop reason: HOSPADM

## 2017-01-20 RX ORDER — BUPIVACAINE HYDROCHLORIDE 5 MG/ML
INJECTION, SOLUTION EPIDURAL; INTRACAUDAL AS NEEDED
Status: DISCONTINUED | OUTPATIENT
Start: 2017-01-20 | End: 2017-01-20 | Stop reason: HOSPADM

## 2017-01-20 RX ORDER — SODIUM CHLORIDE 9 MG/ML
INJECTION, SOLUTION INTRAVENOUS CONTINUOUS PRN
Status: DISCONTINUED | OUTPATIENT
Start: 2017-01-20 | End: 2017-01-20 | Stop reason: HOSPADM

## 2017-01-20 RX ORDER — AMITRIPTYLINE HYDROCHLORIDE 50 MG/1
50 TABLET, FILM COATED ORAL NIGHTLY
COMMUNITY

## 2017-01-20 RX ORDER — SIMVASTATIN 40 MG
40 TABLET ORAL NIGHTLY
Status: DISCONTINUED | OUTPATIENT
Start: 2017-01-20 | End: 2017-01-21 | Stop reason: HOSPADM

## 2017-01-20 RX ORDER — ONDANSETRON 2 MG/ML
INJECTION INTRAMUSCULAR; INTRAVENOUS AS NEEDED
Status: DISCONTINUED | OUTPATIENT
Start: 2017-01-20 | End: 2017-01-20 | Stop reason: HOSPADM

## 2017-01-20 RX ORDER — FENTANYL CITRATE 50 UG/ML
INJECTION, SOLUTION INTRAMUSCULAR; INTRAVENOUS AS NEEDED
Status: DISCONTINUED | OUTPATIENT
Start: 2017-01-20 | End: 2017-01-20 | Stop reason: HOSPADM

## 2017-01-20 RX ORDER — ONDANSETRON 2 MG/ML
4 INJECTION INTRAMUSCULAR; INTRAVENOUS EVERY 6 HOURS PRN
Status: DISCONTINUED | OUTPATIENT
Start: 2017-01-20 | End: 2017-01-21 | Stop reason: HOSPADM

## 2017-01-20 RX ORDER — MIDAZOLAM HYDROCHLORIDE 1 MG/ML
INJECTION INTRAMUSCULAR; INTRAVENOUS AS NEEDED
Status: DISCONTINUED | OUTPATIENT
Start: 2017-01-20 | End: 2017-01-20 | Stop reason: HOSPADM

## 2017-01-20 RX ORDER — VANCOMYCIN HYDROCHLORIDE
15 ONCE
Status: COMPLETED | OUTPATIENT
Start: 2017-01-21 | End: 2017-01-21

## 2017-01-20 RX ORDER — LIDOCAINE HYDROCHLORIDE 10 MG/ML
INJECTION, SOLUTION INFILTRATION; PERINEURAL AS NEEDED
Status: DISCONTINUED | OUTPATIENT
Start: 2017-01-20 | End: 2017-01-20 | Stop reason: HOSPADM

## 2017-01-20 RX ORDER — VANCOMYCIN HYDROCHLORIDE
15
Status: DISCONTINUED | OUTPATIENT
Start: 2017-01-20 | End: 2017-01-20 | Stop reason: HOSPADM

## 2017-01-20 RX ADMIN — VANCOMYCIN HYDROCHLORIDE 1250 MG: 1 INJECTION, POWDER, LYOPHILIZED, FOR SOLUTION INTRAVENOUS at 14:10

## 2017-01-20 RX ADMIN — AMLODIPINE BESYLATE 2.5 MG: 2.5 TABLET ORAL at 11:13

## 2017-01-20 RX ADMIN — PANTOPRAZOLE SODIUM 40 MG: 40 TABLET, DELAYED RELEASE ORAL at 05:15

## 2017-01-20 RX ADMIN — ASPIRIN 81 MG: 81 TABLET, COATED ORAL at 20:12

## 2017-01-20 RX ADMIN — SIMVASTATIN 40 MG: 40 TABLET, FILM COATED ORAL at 20:13

## 2017-01-20 RX ADMIN — LATANOPROST 1 DROP: 50 SOLUTION OPHTHALMIC at 20:13

## 2017-01-20 NOTE — PROGRESS NOTES
CHIEF COMPLAINT: Weakness    SUBJECTIVE EVALUATION:  The patient denies headache, diplopia, dysarthria, dysphagia, weakness, paresthesias, syncopal episode.  She denies chest pain, dyspnea, orthopnea or paroxysmal nocturnal dyspnea.    REVIEW OF SYSTEMS:  14 systems were reviewed and are negative except as noted subjective evaluation section    OBJECTIVE EVALUATION  Vital signs reviewed and within normal range  Gen. appearance: Pleasant  female in no distress. He is awake and alert. She is oriented to person place and time  HEENT: Pupils reactive and responsive to light. Extraocular muscles are intact. Dry mucous membrane: Diminished skin turgor. No oral lesions thrush.  Chest: Clear to auscultation bilaterally.  No wheezing, rales or rhonchi  Cardiovascular: Bradycardia. No murmurs rubs or gallop no increased jugular venous pulsation  Abdomen: Soft. Non tender to palpation. No palpable masses. No CVA tenderness. Normal bowel sounds.   Extremities: No skin lesions or rashes. No edema. Intact peripheral pulses  Neurologic examination: Motor tone and strength are normal. Intact deep tendon reflexes. No focal neurological deficit.  Psychiatric: appropriate affect    LABORATORY  DATA  1.  Creatinine of 0.7.  Electrolytes within normal range.  Hemoglobin A1c 5.7.  TSH 1.399.  Magnesium 1.9.  2.  Complete blood count within normal range  3.  Urinalysis is essentially bland    EKG  Complete AV disassociation consistent with third degree atrioventricular block    ASSESSMENT AND PLAN:    This is a 76-year-old  female with a past medical history significant for long-standing essential hypertension, hyperlipidemia, hypothyroidism who had sudden onset of weakness and presented to an outside emergency room where she was found to have a third-degree heart block and was sent to [facility] for further evaluation and management    1.  Weakness due to AV block third degree.  Evaluation by EP today for pacemaker  placement    2.  Essential hypertension, long-standing.  Under good control.  Continue amlodipine and losartan  3.  Hyperlipidemia on statin  4.  Hypothyroidism, continue levothyroxine

## 2017-01-20 NOTE — CONSULTS
"Vernoica Whatley  1940  [phone]      01/20/17      [address] Talon France MD  [address] / [place] [address]    Chief Complaint: CHB    Problem List:  1. Complete Heart Block  2. HTN  3. HLP  4. Hypothyroidism  5. Basal Cell Carcinoma (arm)  6. Chronic Back Pain/neck pain  7. Surgical History   A. Hysterectomy   B. Cataract   C. Skin cancer excision      History of Present Illness:   76 year old WF with history of HTN, HLP, and hypothyroidism who is transferred from [facility] due to CHB. She states that she had an episode yesterday in which she felt \"something came over me\". She started having left sided tingling and numbness. She went to the ER at [facility] and was found to be in CHB. She has been hemodynamically stable. She has not been on any AVN blocking agents. She was ruled out for stroke with CT head which was negative. Laboratory work up unremarkable with normal Cr, electrolytes, and troponin. There was evidence of small left pleural effusion on CXR. CXR here has been done and shows same. WBC count is normal. She has no prior cardiac history.     Allergies   Allergen Reactions   • Penicillins Rash     Rash and itching     • Cantaloupe Extract Allergy Skin Test    • Cat Hair Extract    • Chicken Allergy    • Dog Epithelium      hair   • Eggs Or Egg-Derived Products    • Grass    • Horse-Derived Products      Horse hair   • Peanuts [Peanut Oil]    • Restoril [Temazepam] Hallucinations   • Watermelon [Citrullus Vulgaris]    • Sulfa Antibiotics Rash       Prior to Admission Medications     Prescriptions Last Dose Informant Patient Reported? Taking?    amitriptyline (ELAVIL) 50 MG tablet   Yes Yes    Take 50 mg by mouth Every Night.    amLODIPine (NORVASC) 2.5 MG tablet 1/19/2017 Self Yes Yes    Take 2.5 mg by mouth Daily.    aspirin 81 MG EC tablet 1/18/2017 Self Yes Yes    Take 81 mg by mouth Every Night.    latanoprost (XALATAN) 0.005 % ophthalmic " solution 1/18/2017 Self Yes Yes    Administer 1 drop to both eyes Every Night.    levothyroxine (SYNTHROID, LEVOTHROID) 88 MCG tablet 1/18/2017  Yes Yes    Take 88 mcg by mouth Every Night.    losartan (COZAAR) 100 MG tablet 1/19/2017 Self Yes Yes    Take 100 mg by mouth Daily.    simvastatin (ZOCOR) 40 MG tablet 1/18/2017 Self Yes Yes    Take 40 mg by mouth Every Night.            Current Facility-Administered Medications:   •  acetaminophen (TYLENOL) tablet 650 mg, 650 mg, Oral, Q4H PRN, Earl Rhodes MD  •  amLODIPine (NORVASC) tablet 2.5 mg, 2.5 mg, Oral, Daily, Earl Rhodes MD, Stopped at 01/20/17 0900  •  aspirin EC tablet 81 mg, 81 mg, Oral, Nightly, Earl Rhodes MD, 81 mg at 01/19/17 2157  •  atorvastatin (LIPITOR) tablet 20 mg, 20 mg, Oral, Daily, Earl Rhodes MD  •  hydrALAZINE (APRESOLINE) injection 10 mg, 10 mg, Intravenous, Q6H PRN, GM Dahl  •  HYDROcodone-acetaminophen (NORCO) 5-325 MG per tablet 1 tablet, 1 tablet, Oral, Q4H PRN, Earl Rhodes MD  •  latanoprost (XALATAN) 0.005 % ophthalmic solution 1 drop, 1 drop, Both Eyes, Nightly, Earl Rhodes MD, 1 drop at 01/19/17 2157  •  levothyroxine (SYNTHROID, LEVOTHROID) tablet 88 mcg, 88 mcg, Oral, Nightly, Earl Rhodes MD, 88 mcg at 01/19/17 2157  •  LORazepam (ATIVAN) injection 0.5 mg, 0.5 mg, Intravenous, Q6H PRN, Earl Rhodes MD  •  losartan (COZAAR) tablet 100 mg, 100 mg, Oral, Daily, Earl Rhodes MD  •  ondansetron (ZOFRAN) injection 4 mg, 4 mg, Intravenous, Q6H PRN, Earl Rhodes MD  •  pantoprazole (PROTONIX) EC tablet 40 mg, 40 mg, Oral, Q AM, Earl Rhodes MD, 40 mg at 01/20/17 0515  •  sodium chloride 0.9 % flush 1-10 mL, 1-10 mL, Intravenous, PRN, Earl Rhodes MD  •  vancomycin IVPB 1250 mg in 250 mL NS (premix), 15 mg/kg, Intravenous, 90 Min Pre-Op, Ishan Melchor MD    Social History     Social History   • Marital status:      Spouse name: N/A   • Number of children: 2   • Years of education: N/A     Occupational  "History   • Homemaker      Social History Main Topics   • Smoking status: Never Smoker   • Smokeless tobacco: Never Used   • Alcohol use No   • Drug use: No   • Sexual activity: Yes     Partners: Male      Comment:      Other Topics Concern   • None     Social History Narrative   • None       Family History   Problem Relation Age of Onset   • Heart disease Mother    • Heart disease Father        Review of Systems: Pertinent positives noted above in the HPI and problem list.  All other reviewed systems negative.     Vitals:    01/19/17 1930 01/19/17 2300 01/20/17 0300 01/20/17 0600   BP: (!) 212/76 148/77 135/44 145/76   BP Location: Right arm  Left arm Left arm   Patient Position: Lying  Lying Lying   Pulse: 54  (!) 37 (!) 43   Resp: 18  18 18   Temp: 98 °F (36.7 °C)   97.5 °F (36.4 °C)   TempSrc: Oral   Oral   SpO2: 98%      Weight: 166 lb 3.2 oz (75.4 kg)      Height: 61\" (154.9 cm)          Physical Exam:  GEN: Well nourished, Well- developed  No acute distress  HEENT: Normocephalic, Atraumatic, PERRLA, moist mucous membranes  NECK: supple, NO JVD, no thyromegaly, no lymphadenopathy  CARD: S1S2  RRR, bradycardic no murmur, gallop, rub  LUNGS: Clear to auscultataion, normal respiratory effort  ABDOMEN: Soft, nontender, normal bowel sounds  EXTREMITIES:No gross deformities,  No clubbing, cyanosis, or edema  SKIN: Warm, dry  NEURO: No focal deficits  PSYCHIATRIC: Normal affect and mood      Data:     Results from last 7 days  Lab Units 01/20/17  0419   WBC 10*3/mm3 6.77   HEMOGLOBIN g/dL 12.5   HEMATOCRIT % 38.6   PLATELETS 10*3/mm3 265       Results from last 7 days  Lab Units 01/20/17  0419   SODIUM mmol/L 147*   POTASSIUM mmol/L 4.6   CHLORIDE mmol/L 109   TOTAL CO2 mmol/L 30.0   BUN mg/dL 12   CREATININE mg/dL 0.70   GLUCOSE mg/dL 87        Results from last 7 days  Lab Units 01/20/17  0419   HEMOGLOBIN A1C % 5.70*       Results from last 7 days  Lab Units 01/20/17  0419   CHOLESTEROL mg/dL 109 "   TRIGLYCERIDES mg/dL 132   HDL CHOL mg/dL 37*   LDL CHOL mg/dL 52       Results from last 7 days  Lab Units 01/20/17  0419   TSH mIU/mL 1.399   FREE T4 ng/dL 1.41               Results from last 7 days  Lab Units 01/20/17  0419 01/20/17  0016 01/19/17  2107   TROPONIN I ng/mL <0.006 <0.006 <0.006       Intake/Output Summary (Last 24 hours) at 01/20/17 0911  Last data filed at 01/20/17 0600   Gross per 24 hour   Intake      0 ml   Output    350 ml   Net   -350 ml         Tele: [facility] with rates in 30s  EKG: [facility] 46 bpm      Assessment and Plan:   1. Complete Heart Block- the patient will undergo PM today with Dr. Melchor. The risks, benefits, and alternatives of the procedure have been reviewed and the patient wishes to proceed.   Echocardiogram complete and pending results  2. Small left pleural effusion- on chest x ray, hospitalists following. No sign of infection with lab data or symptoms  3. HTN- monitoring    [facility]  1/20/2017   11:48 AM      IIshan MD, personally performed the services described as documented by the above named individual. I have made any necessary edits and it is both accurate and complete 1/20/2017  3:09 PM

## 2017-01-20 NOTE — H&P
[facility] Medicine Services  HISTORY AND PHYSICAL    Primary Care Physician: Matthew France MD    Subjective     Chief Complaint:  Complete Heart Block    History of Present Illness:  This is a 76 year old  female that has been accepted in transfer by the cardiology service from [facility] for complete heart block.  She is accompanied by her  of 60 years and one of her daughters.  She reports this afternoon during a stressful event feeling her left face go numb.  It lasted a few seconds.  She also reports left neck pain that radiated to her left upper extremity.  She reports significant stressors in her life with upper back and neck spasms.  She attended the [facility] ED for evaluation.  An ECG was interpreted as complete heart block and our cardiology service was consulted for transfer.  She presently denies any further or ongoing neurological changes, retrosternal chest pain, racing heart beats, pedal edema, shortness of air or lightheadedness.  She denies confusion, loss of speech or extremity weakness.  Family has not witnessed any facial asymmetry.    Review of Systems   Constitutional: Negative.  Negative for diaphoresis.   Eyes: Negative.  Negative for photophobia and visual disturbance.   Respiratory: Negative.  Negative for choking, chest tightness and shortness of breath.    Cardiovascular: Negative.  Negative for chest pain, palpitations and leg swelling.   Gastrointestinal: Negative for abdominal pain, diarrhea, nausea and vomiting.   Endocrine: Negative.  Negative for polydipsia, polyphagia and polyuria.   Genitourinary: Negative.    Musculoskeletal: Positive for neck pain.   Skin: Negative.  Negative for rash.   Allergic/Immunologic: Negative.  Negative for immunocompromised state.   Neurological: Positive for numbness and headaches. Negative for dizziness, tremors, seizures, syncope, facial asymmetry, speech difficulty, weakness and  light-headedness.   Hematological: Negative.    Psychiatric/Behavioral: The patient is nervous/anxious.    Otherwise complete ROS performed and negative except as mentioned in the HPI.    Past Medical History:   Past Medical History   Diagnosis Date   • BCC (basal cell carcinoma), arm    • Glaucoma    • HLD (hyperlipidemia)    • HTN (hypertension)    • Hypothyroidism      Past Surgical History:  Past Surgical History   Procedure Laterality Date   • Hysterectomy  1984   • Cataract extraction Bilateral 2015   • Skin cancer excision Bilateral 2016       Family History: family history includes Heart disease in her father and mother.    Social History:  reports that she has never smoked. She has never used smokeless tobacco. She reports that she does not drink alcohol or use illicit drugs.  She has been  for 60 years.  Her  is at the bedside.    Medications:  Prescriptions Prior to Admission   Medication Sig Dispense Refill Last Dose   • amLODIPine (NORVASC) 2.5 MG tablet Take 2.5 mg by mouth Daily.   1/19/2017 at 0900   • aspirin 81 MG EC tablet Take 81 mg by mouth Every Night.   1/18/2017 at 2200   • latanoprost (XALATAN) 0.005 % ophthalmic solution Administer 1 drop to both eyes Every Night.   1/18/2017 at 2200   • levothyroxine (SYNTHROID, LEVOTHROID) 88 MCG tablet Take 88 mcg by mouth Every Night.   1/18/2017 at 2200   • losartan (COZAAR) 100 MG tablet Take 100 mg by mouth Daily.   1/19/2017 at 0900   • simvastatin (ZOCOR) 40 MG tablet Take 40 mg by mouth Every Night.   1/18/2017 at 2200     Allergies:  Allergies   Allergen Reactions   • Penicillins Rash     Rash and itching     • Cantaloupe Extract Allergy Skin Test    • Cat Hair Extract    • Chicken Allergy    • Dog Epithelium      hair   • Eggs Or Egg-Derived Products    • Grass    • Horse-Derived Products      Horse hair   • Peanuts [Peanut Oil]    • Restoril [Temazepam] Hallucinations   • Watermelon [Citrullus Vulgaris]    • Sulfa Antibiotics Rash  "      Objective     Physical Exam:  Vital Signs:   Visit Vitals   • BP (!) 212/76 (BP Location: Right arm, Patient Position: Lying)   • Pulse 54   • Resp 18   • Ht 61\" (154.9 cm)   • Wt 166 lb 3.2 oz (75.4 kg)   • BMI 31.4 kg/m2     Physical Exam   Constitutional: She is oriented to person, place, and time. She appears well-developed and well-nourished. She is cooperative.   HENT:   Head: Normocephalic and atraumatic.   Right Ear: Hearing and external ear normal.   Left Ear: Hearing and external ear normal.   Nose: Nose normal.   Mouth/Throat: Uvula is midline, oropharynx is clear and moist and mucous membranes are normal. She has dentures.   Eyes: Conjunctivae and EOM are normal. Pupils are equal, round, and reactive to light. No scleral icterus.   Neck: Trachea normal. Neck supple. No JVD present. Muscular tenderness present. Carotid bruit is not present. No thyromegaly present.   Cardiovascular: Normal rate, regular rhythm and intact distal pulses.  Exam reveals distant heart sounds.    Pulmonary/Chest: Effort normal. She has rhonchi in the right lower field. She has no rales.   Abdominal: Soft. Bowel sounds are normal. There is no hepatosplenomegaly. There is no tenderness.   Musculoskeletal: Normal range of motion.   Lymphadenopathy:     She has no cervical adenopathy.   Neurological: She is alert and oriented to person, place, and time. She has normal strength and normal reflexes. She is not disoriented. No cranial nerve deficit or sensory deficit. Gait normal.   Skin: Skin is warm and dry.   Psychiatric: Her speech is normal and behavior is normal. Judgment and thought content normal. Her mood appears anxious. Cognition and memory are normal.   Nursing note and vitals reviewed.    Results Reviewed:  [facility] labs, imaging and ECG reports reviewed.    I have personally reviewed and interpreted available lab data, radiology studies and ECG obtained at time of admission.     Assessment / Plan "     Assessment/Problem List:   Principal Problem:    Complete heart block  Active Problems:    Pleural effusion    HLD (hyperlipidemia)    HTN (hypertension)    Hypothyroidism    Plan:  We will admit to telemetry as an inpatient.  Cardiology has been consulted.  They plan to discuss pacemaker implantation with the patient.  She has been made NPO.  We will continue with hydration, comfort medications including prn ativan for stress.  The chest x-ray report from [facility] identified a small pleural effusion on the left.  We will proceed with repeat imaging and consider CT of chest if findings correlate.  We will continue with her home medications for blood pressure control.  Labs have been ordered.  The plan has been discussed with the patient and her .      DVT prophylaxis:  Compression and phoebe hose.  Code Status:  Full  Admission Status: Patient will be admitted to [facility].     Earl Rhodes MD 01/19/17 8:59 PM

## 2017-01-20 NOTE — PROGRESS NOTES
Discharge Planning Assessment  [facility]     Patient Name: Veronica Whatley  MRN: [id number]  Today's Date: 1/20/2017    Admit Date: 1/19/2017          Discharge Needs Assessment       01/20/17 1146    Living Environment    Lives With spouse   Pt resides in [facility] with her  Jaya    Living Arrangements apartment    Provides Primary Care For no one    Quality Of Family Relationships supportive;helpful;involved    Able to Return to Prior Living Arrangements yes    Discharge Needs Assessment    Concerns To Be Addressed denies needs/concerns at this time;no discharge needs identified    Readmission Within The Last 30 Days no previous admission in last 30 days    Anticipated Changes Related to Illness none    Equipment Currently Used at Home none    Equipment Needed After Discharge none    Transportation Available car;family or friend will provide    Discharge Contact Information if Applicable [phone]            Discharge Plan       01/20/17 1147    Case Management/Social Work Plan    Plan home    Patient/Family In Agreement With Plan yes    Additional Comments Spoke with pt and multiple family members at bedside, pt plans to return home with assistance of family as needed. Pt denies discharge needs at this time. CM will continue to follow.         Discharge Placement     No information found                Demographic Summary       01/20/17 1143    Referral Information    Referral Source admission list    Contact Information    Permission Granted to Share Information With     Primary Care Physician Information    Name Matthew France            Functional Status       01/20/17 1144    Functional Status Current    Current Functional Level Comment see previous charting    Functional Status Prior    Ambulation 0-->independent    Transferring 0-->independent    Toileting 0-->independent    Bathing 0-->independent    Dressing 0-->independent    Eating 0-->independent    Communication  0-->understands/communicates without difficulty    Swallowing 0-->swallows foods/liquids without difficulty    IADL    Medications independent   Pt confirms she has prescription drug coverage and uses mail order, pt denies issues obtaining or affording currently prescribed medications    Meal Preparation independent    Housekeeping independent    Laundry independent    Shopping independent    Oral Care independent    Employment/Financial    Financial Concerns none   Pt confirms she has [employer] Medicare and denies concerns or disruption in coverage            Psychosocial     None            Abuse/Neglect     None            Legal     None            Substance Abuse     None            Patient Forms     None          Tanisha Lynn

## 2017-01-21 ENCOUNTER — APPOINTMENT (OUTPATIENT)
Dept: GENERAL RADIOLOGY | Facility: HOSPITAL | Age: 77
End: 2017-01-21

## 2017-01-21 VITALS
HEART RATE: 82 BPM | RESPIRATION RATE: 17 BRPM | HEIGHT: 61 IN | WEIGHT: 166 LBS | BODY MASS INDEX: 31.34 KG/M2 | TEMPERATURE: 97.8 F | OXYGEN SATURATION: 91 % | DIASTOLIC BLOOD PRESSURE: 88 MMHG | SYSTOLIC BLOOD PRESSURE: 146 MMHG

## 2017-01-21 PROCEDURE — 99024 POSTOP FOLLOW-UP VISIT: CPT | Performed by: NURSE PRACTITIONER

## 2017-01-21 PROCEDURE — 99024 POSTOP FOLLOW-UP VISIT: CPT | Performed by: INTERNAL MEDICINE

## 2017-01-21 PROCEDURE — 71020 HC CHEST PA AND LATERAL: CPT

## 2017-01-21 PROCEDURE — 25010000002 VANCOMYCIN HCL IN NACL 1.25-0.9 GM/250ML-% SOLUTION: Performed by: INTERNAL MEDICINE

## 2017-01-21 RX ADMIN — LOSARTAN POTASSIUM 100 MG: 50 TABLET, FILM COATED ORAL at 12:55

## 2017-01-21 RX ADMIN — LEVOTHYROXINE SODIUM 88 MCG: 88 TABLET ORAL at 10:04

## 2017-01-21 RX ADMIN — PANTOPRAZOLE SODIUM 40 MG: 40 TABLET, DELAYED RELEASE ORAL at 06:32

## 2017-01-21 RX ADMIN — AMLODIPINE BESYLATE 2.5 MG: 2.5 TABLET ORAL at 10:04

## 2017-01-21 RX ADMIN — HYDROCODONE BITARTRATE AND ACETAMINOPHEN 1 TABLET: 5; 325 TABLET ORAL at 02:58

## 2017-01-21 RX ADMIN — VANCOMYCIN HYDROCHLORIDE 1250 MG: 1 INJECTION, POWDER, LYOPHILIZED, FOR SOLUTION INTRAVENOUS at 02:58

## 2017-01-21 NOTE — PLAN OF CARE
Problem: Pressure Ulcer Risk (Fitz Scale) (Adult,Obstetrics,Pediatric)  Goal: Skin Integrity  Outcome: Ongoing (interventions implemented as appropriate)

## 2017-01-21 NOTE — PROGRESS NOTES
Veronica Whatley  [id number]  1940   LOS: 2 days   Patient Care Team:  Matthew France MD as PCP - General (Family Medicine)    Chief Complaint:  CHB    Subjective      Status post pacemaker implantation 1/20/2017 for complete heart block, currently no chest pain, shortness of breath, fatigue, presyncope, syncope, sputum production, cough.    Objective     Vital Sign Min/Max for last 24 hours  Temp  Min: 97.2 °F (36.2 °C)  Max: 98.1 °F (36.7 °C)   BP  Min: 136/75  Max: 218/88   Pulse  Min: 52  Max: 97   Resp  Min: 8  Max: 18   SpO2  Min: 91 %  Max: 98 %   Flow (L/min)  Min: 2  Max: 2   Weight  Min: 166 lb (75.3 kg)  Max: 166 lb (75.3 kg)     Last 2 weights    01/20/17  1224 01/20/17  1230   Weight: 166 lb (75.3 kg) 166 lb (75.3 kg)         Intake/Output Summary (Last 24 hours) at 01/21/17 1050  Last data filed at 01/21/17 0258   Gross per 24 hour   Intake    250 ml   Output      0 ml   Net    250 ml       Physical Exam:     General Appearance:    Alert, cooperative, in no acute distress   Lungs:     Clear to auscultation,respirations regular, even and                   unlabored    Heart:    Regular and normal rate, normal S1 and S2, no            murmur, no gallop, no rub, no click   Abdomen:  Extremities:   Soft, non-tender        No edema             Pulses:   Pulses palpable and equal bilaterally    Left subclavian pacemaker site clean dry and intact, with dressing    Results Review:     Results from last 7 days  Lab Units 01/20/17  0419   SODIUM mmol/L 147*   POTASSIUM mmol/L 4.6   CHLORIDE mmol/L 109   TOTAL CO2 mmol/L 30.0   BUN mg/dL 12   CREATININE mg/dL 0.70   GLUCOSE mg/dL 87   CALCIUM mg/dL 9.3       Results from last 7 days  Lab Units 01/20/17  0419   WBC 10*3/mm3 6.77   HEMOGLOBIN g/dL 12.5   HEMATOCRIT % 38.6   PLATELETS 10*3/mm3 265       Results from last 7 days  Lab Units 01/20/17  0419   CHOLESTEROL mg/dL 109   TRIGLYCERIDES mg/dL 132   HDL CHOL mg/dL 37*   LDL 52    Results from last 7  days  Lab Units 01/20/17  0419   HEMOGLOBIN A1C % 5.70*       Results from last 7 days  Lab Units 01/20/17  0419 01/20/17  0016 01/19/17  2107   TROPONIN I ng/mL <0.006 <0.006 <0.006   BNP 94  · Echocardiogram 1/20/2017; LVEF 0.60, LV diastolic dysfunction grade 1A consistent with impaired relaxation, mild TR, moderate MR  · Carotid duplex 1/20/2017; MARILU stenosis 0-49 percent, normal proximal LICA    Medication Review: Reviewed    Assessment/Plan      Patient with history of hypertension and hyperlipidemia transferred from [facility] due to complete heart block associated with left-sided tingling and numbness.  She has not been on any AV node blocking agents and was ruled out for stroke with a negative CT of the head.  Chest x-ray shows a small left pleural effusion.  Patient had [employer] dual-chamber pacemaker implantation 1/20/2017 with Dr. Melchor. Can go home on current cardiac medications; amlodipine 2.5 mg daily, aspirin 81 mg nightly, losartan 100 mg daily, simvastatin 40 mg nightly.  She will need a follow-up wound check in 7-10 days with Dr. Melchor.  Discharge instructions discussed with patient and  in room.  We will allow home today.  Chest x-ray from earlier today appears acceptable with normal cardiomediastinum and nominal pacemaker placement without overt congestive heart failure, pneumothorax, or pleural effusion.    Principal Problem:    Complete heart block  Active Problems:    HLD (hyperlipidemia)    HTN (hypertension)    Hypothyroidism    Pleural effusion    MOOSE Mobley    I, Gabriel Hewitt MD, [facility], personally performed the services described in this documentation as scribed by the above named individual in my presence, and it is both accurate and complete.     01/21/17  10:50 AM

## 2017-01-21 NOTE — DISCHARGE INSTR - LAB
Ishan Melchor  Cardiology  Cardiac Electrophysiology [phone] Phone [phone] [address]ax [phone] [id number]     Please call as soon as possible to make an appointment for a wound check in 7-10 days and a Pacemaker check in 10-12 weeks.

## 2017-01-22 LAB — BACTERIA SPEC AEROBE CULT: NORMAL

## 2017-01-23 ENCOUNTER — DOCUMENTATION (OUTPATIENT)
Dept: CARDIAC REHAB | Facility: HOSPITAL | Age: 77
End: 2017-01-23

## 2017-01-23 NOTE — PROGRESS NOTES
Order received for Phase II Cardiac Rehab. Staff reviewed chart and patient does not have qualifying diagnosis for Phase II Cardiac Rehab at this time.  Staff available if further consult needed.

## 2017-01-24 NOTE — DISCHARGE SUMMARY
"  Date of Discharge:  1/24/2017    Patient Care Team:  Matthew France MD as PCP - General (Family Medicine)    Discharge Diagnosis: CHB    Presenting Problem  Complete heart block [I44.2]    Allergies   Allergen Reactions   • Penicillins Rash     Rash and itching     • Cantaloupe Extract Allergy Skin Test    • Cat Hair Extract    • Chicken Allergy    • Dog Epithelium      hair   • Eggs Or Egg-Derived Products    • Grass    • Horse-Derived Products      Horse hair   • Peanuts [Peanut Oil]    • Restoril [Temazepam] Hallucinations   • Watermelon [Citrullus Vulgaris]    • Sulfa Antibiotics Rash       Discharge Medications   Veronica Whatley   Home Medication Instructions SHON:[id number]    Printed on:01/24/17 1040   Medication Information                      amitriptyline (ELAVIL) 50 MG tablet  Take 50 mg by mouth Every Night.             amLODIPine (NORVASC) 2.5 MG tablet  Take 2.5 mg by mouth Daily.             aspirin 81 MG EC tablet  Take 81 mg by mouth Every Night.             latanoprost (XALATAN) 0.005 % ophthalmic solution  Administer 1 drop to both eyes Every Night.             levothyroxine (SYNTHROID, LEVOTHROID) 88 MCG tablet  Take 88 mcg by mouth Every Night.             losartan (COZAAR) 100 MG tablet  Take 100 mg by mouth Daily.             simvastatin (ZOCOR) 40 MG tablet  Take 40 mg by mouth Every Night.                 Procedures Performed  Procedure(s):  · Pacemaker DC new  ·   Echocardiogram 1/20/17; LVEF 0.60, LV diastolic dysfunction grade 1A consistent with impaired relaxation, mild TR, moderate MR  · Carotid duplex 1/20/17; MARILU stenosis 0-49 percent, normal proximal LICA  · Chest x-rays ×2; Negative chest for active disease. Suggestion of minimal atelectasis at the left base    History of Present Illness  76 year old WF with history of HTN, HLP, and hypothyroidism who is transferred from [facility] due to CHB. She states that she had an episode yesterday in which she felt \"something " "came over me\". She started having left sided tingling and numbness. She went to the ER at [facility] and was found to be in CHB. She has been hemodynamically stable. She has not been on any AVN blocking agents. She was ruled out for stroke with CT head which was negative. Laboratory work up unremarkable with normal Cr, electrolytes, and troponin. There was evidence of small left pleural effusion on CXR. CXR here has been done and shows same. WBC count is normal. She has no prior cardiac history    Cardiovascular Disease Risk Factors  hyptertension, hyperlipidemia, increased age    Hospital Course  Patient is a 76 y.o. female presented with CHB on 1/19/17 with rates in the 20-30 bpm range.  She had a dual-chamber [employer] pacemaker implanted on 1/20/2017 by Dr. Melchor.  The device was set at DDD with a lower rate of 50 bpm and upper rate of 120 bpm with set AV delay.  Patient was monitored on telemetry after procedure and overnight and discharged home in stable cardiac condition 1/21/17.  She will follow-up with Dr. Melchor in 7-10 days for a wound check.     Labs    Results from last 7 days  Lab Units 01/20/17  0419   SODIUM mmol/L 147*   POTASSIUM mmol/L 4.6   CHLORIDE mmol/L 109   TOTAL CO2 mmol/L 30.0   BUN mg/dL 12   CREATININE mg/dL 0.70   GLUCOSE mg/dL 87   CALCIUM mg/dL 9.3       Results from last 7 days  Lab Units 01/20/17  0419   WBC 10*3/mm3 6.77   HEMOGLOBIN g/dL 12.5   HEMATOCRIT % 38.6   PLATELETS 10*3/mm3 265       Results from last 7 days  Lab Units 01/20/17  0419   CHOLESTEROL mg/dL 109   TRIGLYCERIDES mg/dL 132   HDL CHOL mg/dL 37*       Results from last 7 days  Lab Units 01/20/17  0419   HEMOGLOBIN A1C % 5.70*       Results from last 7 days  Lab Units 01/20/17  0419 01/20/17  0016 01/19/17  2107   TROPONIN I ng/mL <0.006 <0.006 <0.006            Discharge Disposition  Home or Self Care    Discharge Diet: cardiac    Activity at Discharge: as tolerated    Follow-up Appointments  Future " Appointments  Date Time Provider Department Center   1/27/2017 2:00 PM WOUND CHECK [facility] None     Referrals and Follow-ups to Schedule     Follow-Up    As directed    Needs wound check in 7-10 days with Dr Melchor           Additional information on Labs and Follow-ups:      Ishan Melchor  Cardiology  Cardiac Electrophysiology [phone] Phone [phone] Fax [phone] [phone]     Please call as soon as possible to make an appointment for a wound check in 7-10 days and a Pacemaker check in 10-12 weeks.                  Test Results Pending at Discharge: none         MOOSE Mobley

## 2017-01-27 ENCOUNTER — OFFICE VISIT (OUTPATIENT)
Dept: CARDIOLOGY | Facility: CLINIC | Age: 77
End: 2017-01-27

## 2017-01-27 DIAGNOSIS — I44.2 COMPLETE HEART BLOCK (HCC): Primary | ICD-10-CM

## 2017-01-27 PROCEDURE — 99024 POSTOP FOLLOW-UP VISIT: CPT | Performed by: INTERNAL MEDICINE

## 2017-01-27 RX ORDER — AMLODIPINE BESYLATE 10 MG/1
10 TABLET ORAL DAILY
Qty: 30 TABLET | Refills: 6 | Status: SHIPPED | OUTPATIENT
Start: 2017-01-27 | End: 2017-01-27 | Stop reason: DRUGHIGH

## 2017-01-27 RX ORDER — LOSARTAN POTASSIUM AND HYDROCHLOROTHIAZIDE 25; 100 MG/1; MG/1
1 TABLET ORAL DAILY
Qty: 30 TABLET | Refills: 6 | Status: SHIPPED | OUTPATIENT
Start: 2017-01-27 | End: 2017-01-27 | Stop reason: SDUPTHER

## 2017-01-27 RX ORDER — AMLODIPINE BESYLATE 5 MG/1
5 TABLET ORAL
Status: DISCONTINUED | OUTPATIENT
Start: 2017-01-28 | End: 2017-01-27

## 2017-01-27 RX ORDER — LOSARTAN POTASSIUM AND HYDROCHLOROTHIAZIDE 25; 100 MG/1; MG/1
1 TABLET ORAL DAILY
Qty: 30 TABLET | Refills: 6 | Status: SHIPPED | OUTPATIENT
Start: 2017-01-27 | End: 2018-04-11

## 2017-01-27 RX ORDER — AMLODIPINE BESYLATE 5 MG/1
5 TABLET ORAL DAILY
Qty: 30 TABLET | Refills: 6 | Status: SHIPPED | OUTPATIENT
Start: 2017-01-27 | End: 2017-01-27 | Stop reason: SDUPTHER

## 2017-01-27 RX ORDER — AMLODIPINE BESYLATE 5 MG/1
5 TABLET ORAL DAILY
Qty: 30 TABLET | Refills: 6 | Status: SHIPPED | OUTPATIENT
Start: 2017-01-27 | End: 2019-04-17

## 2017-01-27 NOTE — PROGRESS NOTES
WOUND CHECK    2017    Veronica Whatley, : 1940    WOUND CHECK    B/P: 185/107 (Sitting)  (Standing)     Pulse: 103     Patient has fever: [] Temperature if indicated: 98 degrees F     Wound Location: left infraclaviculaer    Dressing Removed [x]        Old Dressing Appearance:  Clean, dry [x]                 Old, bloody drainage []                            Moist, serous drainage []                Moist, thick yellow/green drainage []       Wound Appearance: Redness []                  Drainage []                  Culture obtained []        Color: N/A     Consistency: none     Amount: none         Gloves used, wound cleansed with sterile 4x4 and peroxide [x]       MD notified [] MD orders: Increased BP while in office; ordered Losartan/hctz 100-25 mg daily and Norvasc 5mg daily per Dr. Hewitt  Antibiotic started []  If checked, type   Other:     Appointment for follow-up scheduled for 3 months post procedure [x]    No future appointments.        Nereida Aguayo MA, 17      MD Signature:______________________________ Completed By/Date:

## 2017-02-21 ENCOUNTER — TELEPHONE (OUTPATIENT)
Dept: CARDIOLOGY | Facility: CLINIC | Age: 77
End: 2017-02-21

## 2017-02-21 NOTE — TELEPHONE ENCOUNTER
Patient had a pacemaker placed on 1/20/17. Last week she started having swelling in her left leg, foot and toes. She states that the right leg is ok. I explained to her that Dr. Melchor could not see her this week. She wanted to be seen because she is concerned. I transferred her to the A-fib clinic to schedule an appointment.

## 2017-03-03 ENCOUNTER — OFFICE VISIT (OUTPATIENT)
Dept: CARDIOLOGY | Facility: CLINIC | Age: 77
End: 2017-03-03

## 2017-03-03 DIAGNOSIS — Z95.0 CARDIAC PACEMAKER IN SITU: Primary | ICD-10-CM

## 2017-03-03 PROCEDURE — 99024 POSTOP FOLLOW-UP VISIT: CPT | Performed by: INTERNAL MEDICINE

## 2017-03-03 RX ORDER — CEPHALEXIN 250 MG/5ML
500 POWDER, FOR SUSPENSION ORAL 2 TIMES DAILY
Qty: 200 ML | Refills: 0 | Status: SHIPPED | OUTPATIENT
Start: 2017-03-03 | End: 2017-03-29

## 2017-03-03 NOTE — PROGRESS NOTES
Pt came today because she noticed some drainage at her wound.  She noticed the redness at the beginning of the week but just called today.  She has been afebrile.  Assessment of the wound revealed redness and swelling at the incision site with suture at the mid and distal location.  The wound was cleansed with peroxide using sterile 4x4s/gloves the suture at midline was removed but the distal suture was too small and flush to the skin to retrieve with forceps. GM Mcdonough looked at incision and instructed to start antibiotic.  Pt cannot swallow tablets or capsules well and with her drug allergies we decided to go with Keflex suspension 500mg BID for 10 days.  The pt is to return next Tues for re-evaluation.  Pt instructed to keep dry, put nothing on incision and to not touch it.

## 2017-03-06 ENCOUNTER — OFFICE VISIT (OUTPATIENT)
Dept: CARDIOLOGY | Facility: CLINIC | Age: 77
End: 2017-03-06

## 2017-03-06 DIAGNOSIS — Z95.0 CARDIAC PACEMAKER IN SITU: Primary | ICD-10-CM

## 2017-03-06 PROCEDURE — 99024 POSTOP FOLLOW-UP VISIT: CPT | Performed by: INTERNAL MEDICINE

## 2017-03-06 NOTE — PROGRESS NOTES
Recheck of wound, no dressing present.  Left infraclavicular site cleansed using gloves, peroxide and sterile 4x4s.  Incision today appears to be less red than compared to Friday, suture removed by Dr. Melchor at midline of incision.  Distal incision where visible suture was seen on Friday was no longer seen.  Patient to continue antibiotics for at least 7 days and dab incision with peroxide BID, to call if any problems arise, otherwise we will see pt in April.

## 2017-03-29 ENCOUNTER — OFFICE VISIT (OUTPATIENT)
Dept: CARDIOLOGY | Facility: CLINIC | Age: 77
End: 2017-03-29

## 2017-03-29 VITALS
WEIGHT: 166.4 LBS | SYSTOLIC BLOOD PRESSURE: 132 MMHG | HEART RATE: 106 BPM | BODY MASS INDEX: 31.42 KG/M2 | HEIGHT: 61 IN | DIASTOLIC BLOOD PRESSURE: 80 MMHG

## 2017-03-29 DIAGNOSIS — I10 ESSENTIAL HYPERTENSION: ICD-10-CM

## 2017-03-29 DIAGNOSIS — I44.2 COMPLETE HEART BLOCK (HCC): Primary | ICD-10-CM

## 2017-03-29 PROCEDURE — 99024 POSTOP FOLLOW-UP VISIT: CPT | Performed by: INTERNAL MEDICINE

## 2017-03-29 PROCEDURE — 93288 INTERROG EVL PM/LDLS PM IP: CPT | Performed by: INTERNAL MEDICINE

## 2017-03-29 NOTE — PROGRESS NOTES
Veronica Whatley  1940  [phone]      03/29/2017    [facility] CARDIOLOGY     Matthew France MD  [address] [place] [address]    Chief Complaint   Patient presents with   • Slow Heart Rate       Problem List:  1) CHB   A. Echocardiogram 1/20/17; LVEF 0.60, LV diastolic dysfunction grade 1A consistent with impaired relaxation, mild TR, moderate MR   B./ DDD PM implant 1/20/17 S[facility]  2) HTN  3) HLP  4) Hypothyroidism  5) Chronic back/neck pain    Allergies  Allergies   Allergen Reactions   • Penicillins Rash     Rash and itching     • Cantaloupe Extract Allergy Skin Test    • Cat Hair Extract    • Chicken Allergy    • Dog Epithelium      hair   • Eggs Or Egg-Derived Products    • Grass    • Horse-Derived Products      Horse hair   • Peanuts [Peanut Oil]    • Restoril [Temazepam] Hallucinations   • Watermelon [Citrullus Vulgaris]    • Sulfa Antibiotics Rash       Current Medications    Current Outpatient Prescriptions:   •  amitriptyline (ELAVIL) 50 MG tablet, Take 50 mg by mouth Every Night., Disp: , Rfl:   •  amLODIPine (NORVASC) 5 MG tablet, Take 1 tablet by mouth Daily., Disp: 30 tablet, Rfl: 6  •  aspirin 81 MG EC tablet, Take 81 mg by mouth Every Night., Disp: , Rfl:   •  latanoprost (XALATAN) 0.005 % ophthalmic solution, Administer 1 drop to both eyes Every Night., Disp: , Rfl:   •  levothyroxine (SYNTHROID, LEVOTHROID) 88 MCG tablet, Take 88 mcg by mouth Every Night., Disp: , Rfl:   •  losartan-hydrochlorothiazide (HYZAAR) 100-25 MG per tablet, Take 1 tablet by mouth Daily., Disp: 30 tablet, Rfl: 6  •  simvastatin (ZOCOR) 40 MG tablet, Take 40 mg by mouth Every Night., Disp: , Rfl:     History of Present Illness   HPI    Pt presents for follow up of AVB/HTN. This is the pt's first visit after PM implant for CHB. She has done well since the pm implanted.  pt denies any syncope, SOB, CP, LH, and dizziness. Denies any hospitalizations, ER visits, bleeding, or  "TIA/CVA symptoms. Overall feels well. Does have chronic back pain. BP at home stable.    ROS:  General:  Denies fatigue, weight gain or loss  Cardiovascular:  Denies CP, PND, syncope, near syncope, edema or palpitations.  Pulmonary:  Denies DOLL, cough, or wheezing    Vitals:    03/29/17 1140   BP: 132/80   BP Location: Right arm   Patient Position: Sitting   Pulse: 106   Weight: 166 lb 6.4 oz (75.5 kg)   Height: 61\" (154.9 cm)       PE:  General: NAD  Neck: no JVD, no carotid bruits, no TM  Heart RRR, NL S1, S2, S4 present, no rubs, murmurs  Lungs: CTA, no wheezes, rhonchi, or rales  Abd: soft, non-tender, NL BS  Ext: No musculoskeletal deformities, no edema, cyanosis, or clubbing  Psych: normal mood and affect    Diagnostic Data:  Procedures      1. Complete heart block    2. Essential hypertension        PM Interrogation: NL PM fxn, Nl battery fxn, No AF     Plan:  1) AVB s/p PM implant doing well  Continue present medications. NL pacemaker function.  2) Anticoagulation  Continue ASA  3) HTN  Wt loss, exercise, salt reduction  4) MR: repeat echo in 12 M    F/up in 12 months      "

## 2017-04-28 ENCOUNTER — TELEPHONE (OUTPATIENT)
Dept: CARDIOLOGY | Facility: CLINIC | Age: 77
End: 2017-04-28

## 2017-04-28 NOTE — TELEPHONE ENCOUNTER
Pt called bc she has had increased HR above 100 for past 3-4 weeks.  She has been having significant back pain and has been taking prednisone.  Remote interrogation strips were reviewed by Dr. Melchor, looks to be sinus tachycardia.  Pt to start lopressor 25mg BID and will send remote again on Monday.

## 2017-05-01 ENCOUNTER — TELEPHONE (OUTPATIENT)
Dept: CARDIOLOGY | Facility: CLINIC | Age: 77
End: 2017-05-01

## 2017-05-08 ENCOUNTER — OFFICE VISIT (OUTPATIENT)
Dept: NEUROSURGERY | Facility: CLINIC | Age: 77
End: 2017-05-08

## 2017-05-08 VITALS
BODY MASS INDEX: 30.78 KG/M2 | SYSTOLIC BLOOD PRESSURE: 124 MMHG | WEIGHT: 163 LBS | HEIGHT: 61 IN | TEMPERATURE: 97.8 F | DIASTOLIC BLOOD PRESSURE: 72 MMHG

## 2017-05-08 DIAGNOSIS — M41.20 SCOLIOSIS (AND KYPHOSCOLIOSIS), IDIOPATHIC: ICD-10-CM

## 2017-05-08 DIAGNOSIS — M51.36 DEGENERATIVE DISC DISEASE, LUMBAR: Primary | ICD-10-CM

## 2017-05-08 PROCEDURE — 99203 OFFICE O/P NEW LOW 30 MIN: CPT | Performed by: NEUROLOGICAL SURGERY

## 2017-05-08 RX ORDER — HYDROCODONE BITARTRATE AND ACETAMINOPHEN 7.5; 325 MG/1; MG/1
1 TABLET ORAL EVERY 6 HOURS PRN
Qty: 45 TABLET | Refills: 0 | Status: SHIPPED | OUTPATIENT
Start: 2017-05-08 | End: 2018-04-11

## 2017-05-08 RX ORDER — CARISOPRODOL 350 MG/1
350 TABLET ORAL
Qty: 30 TABLET | Refills: 0 | Status: SHIPPED | OUTPATIENT
Start: 2017-05-08 | End: 2018-04-11

## 2017-05-08 RX ORDER — NABUMETONE 750 MG/1
750 TABLET, FILM COATED ORAL 2 TIMES DAILY
Qty: 60 TABLET | Refills: 0 | Status: SHIPPED | OUTPATIENT
Start: 2017-05-08 | End: 2018-04-11

## 2017-05-22 ENCOUNTER — TELEPHONE (OUTPATIENT)
Dept: NEUROSURGERY | Facility: CLINIC | Age: 77
End: 2017-05-22

## 2017-08-02 ENCOUNTER — CLINICAL SUPPORT NO REQUIREMENTS (OUTPATIENT)
Dept: CARDIOLOGY | Facility: CLINIC | Age: 77
End: 2017-08-02

## 2017-08-02 DIAGNOSIS — I44.2 COMPLETE HEART BLOCK (HCC): ICD-10-CM

## 2017-08-02 PROCEDURE — 93294 REM INTERROG EVL PM/LDLS PM: CPT | Performed by: INTERNAL MEDICINE

## 2017-08-02 PROCEDURE — 93296 REM INTERROG EVL PM/IDS: CPT | Performed by: INTERNAL MEDICINE

## 2017-10-04 ENCOUNTER — CLINICAL SUPPORT NO REQUIREMENTS (OUTPATIENT)
Dept: CARDIOLOGY | Facility: CLINIC | Age: 77
End: 2017-10-04

## 2017-10-04 VITALS
HEART RATE: 62 BPM | BODY MASS INDEX: 32.46 KG/M2 | DIASTOLIC BLOOD PRESSURE: 84 MMHG | SYSTOLIC BLOOD PRESSURE: 144 MMHG | WEIGHT: 171.8 LBS

## 2017-10-04 DIAGNOSIS — I44.2 COMPLETE HEART BLOCK (HCC): ICD-10-CM

## 2017-10-04 PROCEDURE — 93280 PM DEVICE PROGR EVAL DUAL: CPT | Performed by: INTERNAL MEDICINE

## 2017-10-04 RX ORDER — FUROSEMIDE 20 MG/1
20 TABLET ORAL AS NEEDED
COMMUNITY
End: 2018-05-17 | Stop reason: SDUPTHER

## 2017-10-06 ENCOUNTER — TELEPHONE (OUTPATIENT)
Dept: CARDIOLOGY | Facility: CLINIC | Age: 77
End: 2017-10-06

## 2017-10-06 DIAGNOSIS — I44.2 COMPLETE HEART BLOCK (HCC): Primary | ICD-10-CM

## 2017-10-06 NOTE — TELEPHONE ENCOUNTER
Pt calling because she has had Robert lower leg edema since the beginning of summer. She has been taking lasix 20mg 1 daily per Dr. France PCP. She would like to know if there is anything else she can do.

## 2017-10-06 NOTE — TELEPHONE ENCOUNTER
increase lasix 40 mg po daily. Keep legs elevated while sitting. Alec hose stockings. BMP in one week

## 2017-10-09 RX ORDER — FUROSEMIDE 40 MG/1
40 TABLET ORAL DAILY
Qty: 30 TABLET | Refills: 11 | Status: SHIPPED | OUTPATIENT
Start: 2017-10-09 | End: 2018-04-11

## 2017-10-14 ENCOUNTER — RESULTS ENCOUNTER (OUTPATIENT)
Dept: CARDIOLOGY | Facility: CLINIC | Age: 77
End: 2017-10-14

## 2017-10-14 DIAGNOSIS — I44.2 COMPLETE HEART BLOCK (HCC): ICD-10-CM

## 2018-01-10 ENCOUNTER — TELEPHONE (OUTPATIENT)
Dept: CARDIOLOGY | Facility: CLINIC | Age: 78
End: 2018-01-10

## 2018-01-17 ENCOUNTER — CLINICAL SUPPORT NO REQUIREMENTS (OUTPATIENT)
Dept: CARDIOLOGY | Facility: CLINIC | Age: 78
End: 2018-01-17

## 2018-01-17 DIAGNOSIS — I44.2 COMPLETE HEART BLOCK (HCC): Primary | ICD-10-CM

## 2018-01-17 PROCEDURE — 93294 REM INTERROG EVL PM/LDLS PM: CPT | Performed by: INTERNAL MEDICINE

## 2018-01-17 PROCEDURE — 93296 REM INTERROG EVL PM/IDS: CPT | Performed by: INTERNAL MEDICINE

## 2018-04-11 ENCOUNTER — OFFICE VISIT (OUTPATIENT)
Dept: CARDIOLOGY | Facility: CLINIC | Age: 78
End: 2018-04-11

## 2018-04-11 VITALS
WEIGHT: 174 LBS | DIASTOLIC BLOOD PRESSURE: 72 MMHG | HEIGHT: 61 IN | BODY MASS INDEX: 32.85 KG/M2 | HEART RATE: 65 BPM | SYSTOLIC BLOOD PRESSURE: 134 MMHG

## 2018-04-11 DIAGNOSIS — I44.2 COMPLETE HEART BLOCK (HCC): ICD-10-CM

## 2018-04-11 DIAGNOSIS — I34.0 NON-RHEUMATIC MITRAL REGURGITATION: ICD-10-CM

## 2018-04-11 DIAGNOSIS — R60.0 LOCALIZED EDEMA: Primary | ICD-10-CM

## 2018-04-11 DIAGNOSIS — I10 ESSENTIAL HYPERTENSION: ICD-10-CM

## 2018-04-11 PROCEDURE — 93280 PM DEVICE PROGR EVAL DUAL: CPT | Performed by: INTERNAL MEDICINE

## 2018-04-11 PROCEDURE — 99213 OFFICE O/P EST LOW 20 MIN: CPT | Performed by: INTERNAL MEDICINE

## 2018-04-11 RX ORDER — LOSARTAN POTASSIUM 100 MG/1
100 TABLET ORAL DAILY
COMMUNITY
Start: 2018-02-12

## 2018-04-11 RX ORDER — MELOXICAM 7.5 MG/1
TABLET ORAL DAILY
COMMUNITY
Start: 2018-01-09 | End: 2019-04-17

## 2018-04-11 NOTE — PROGRESS NOTES
Veronica Whatley  1940  [phone]      04/11/2018    [facility] CARDIOLOGY     Matthew France MD  [address] [place] [address]    Chief Complaint   Patient presents with   • complete heart block       Problem List:  1) CHB              A. Echocardiogram 1/20/17; LVEF 0.60, LV diastolic dysfunction grade 1A consistent with impaired relaxation, mild TR, moderate MR              B./ DDD PM implant 1/20/17 SJM  2) HTN  3) HLP  4) Hypothyroidism  5) Chronic back/neck pain       Allergies  Allergies   Allergen Reactions   • Penicillins Rash     Rash and itching     • Cantaloupe Extract Allergy Skin Test    • Cat Hair Extract    • Chicken Allergy    • Dog Epithelium      hair   • Eggs Or Egg-Derived Products    • Grass    • Horse-Derived Products      Horse hair   • Peanuts [Peanut Oil]    • Restoril [Temazepam] Hallucinations   • Watermelon [Citrullus Vulgaris]    • Sulfa Antibiotics Rash       Current Medications    Current Outpatient Prescriptions:   •  amitriptyline (ELAVIL) 50 MG tablet, Take 50 mg by mouth Every Night., Disp: , Rfl:   •  amLODIPine (NORVASC) 5 MG tablet, Take 1 tablet by mouth Daily., Disp: 30 tablet, Rfl: 6  •  aspirin 81 MG EC tablet, Take 81 mg by mouth Every Night., Disp: , Rfl:   •  furosemide (LASIX) 20 MG tablet, Take 20 mg by mouth As Needed., Disp: , Rfl:   •  latanoprost (XALATAN) 0.005 % ophthalmic solution, Administer 1 drop to both eyes Every Night., Disp: , Rfl:   •  levothyroxine (SYNTHROID, LEVOTHROID) 88 MCG tablet, Take 88 mcg by mouth Every Night., Disp: , Rfl:   •  losartan (COZAAR) 100 MG tablet, Daily., Disp: , Rfl:   •  meloxicam (MOBIC) 7.5 MG tablet, Daily., Disp: , Rfl:   •  metoprolol tartrate (LOPRESSOR) 25 MG tablet, TAKE ONE TABLET BY MOUTH TWICE A DAY, Disp: 60 tablet, Rfl: 5  •  simvastatin (ZOCOR) 40 MG tablet, Take 40 mg by mouth Every Night., Disp: , Rfl:     History of Present Illness   HPI    Pt presents for  "follow up of CHB .Since the pt has seen us in clinic last, pt denies any syncope, SOB, CP, LH, and dizziness. Denies any hospitalizations, ER visits, bleeding, or TIA/CVA symptoms. Overall feels well. Has been having worsening ankles swelling. Worse in Pm and better in AM. BP at home stable.    ROS:  General:  Denies fatigue, weight gain or loss  Cardiovascular:  Denies CP, PND, syncope, near syncope, + edema No palpitations.  Pulmonary:  Denies DOLL, cough, or wheezing    Vitals:    04/11/18 1009   BP: 134/72   BP Location: Right arm   Patient Position: Sitting   Pulse: 65   Weight: 78.9 kg (174 lb)   Height: 154.9 cm (61\")       PE:  General: NAD  Neck: no JVD, no carotid bruits, no TM  Heart RRR, NL S1, S2, S4 present, no rubs, murmurs  Lungs: CTA, no wheezes, rhonchi, or rales  Abd: soft, non-tender, NL BS  Ext: No musculoskeletal deformities, no edema, cyanosis, or clubbing  Psych: normal mood and affect    Diagnostic Data:  Procedures      1. Localized edema    2. Complete heart block    3. Essential hypertension    4. Non-rheumatic mitral regurgitation        PM Interrogation: NL PM fxn, Nl battery fxn, No AF, 100% Rv paced     Plan:  1) Edema: take lasix 40 mg as needed; minimize salt, phoebe hose  2) AVB s/p PM implant doing well  Continue present medications. NL pacemaker function.  3) Anticoagulation  Continue ASA  4) HTN  Wt loss, exercise, salt reduction  5) MR: repeat echo     F/up in 12 months      "

## 2018-05-01 ENCOUNTER — HOSPITAL ENCOUNTER (OUTPATIENT)
Dept: CARDIOLOGY | Facility: HOSPITAL | Age: 78
Discharge: HOME | End: 2018-05-01
Attending: INTERNAL MEDICINE | Admitting: INTERNAL MEDICINE

## 2018-05-01 VITALS
BODY MASS INDEX: 32.85 KG/M2 | SYSTOLIC BLOOD PRESSURE: 139 MMHG | DIASTOLIC BLOOD PRESSURE: 96 MMHG | WEIGHT: 174 LBS | HEIGHT: 61 IN

## 2018-05-01 DIAGNOSIS — R60.0 LOCALIZED EDEMA: ICD-10-CM

## 2018-05-01 LAB
BH CV ECHO MEAS - AO MAX PG (FULL): 4.9 MMHG
BH CV ECHO MEAS - AO MAX PG: 9 MMHG
BH CV ECHO MEAS - AO ROOT AREA (BSA CORRECTED): 1.4
BH CV ECHO MEAS - AO ROOT AREA: 4.8 CM^2
BH CV ECHO MEAS - AO ROOT DIAM: 2.5 CM
BH CV ECHO MEAS - AO V2 MAX: 150.8 CM/SEC
BH CV ECHO MEAS - AVA(V,A): 1.7 CM^2
BH CV ECHO MEAS - AVA(V,D): 1.7 CM^2
BH CV ECHO MEAS - BSA(HAYCOCK): 1.9 M^2
BH CV ECHO MEAS - BSA: 1.8 M^2
BH CV ECHO MEAS - BZI_BMI: 32.9 KILOGRAMS/M^2
BH CV ECHO MEAS - BZI_METRIC_HEIGHT: 154.9 CM
BH CV ECHO MEAS - BZI_METRIC_WEIGHT: 78.9 KG
BH CV ECHO MEAS - CI(CUBED): 3.4 L/MIN/M^2
BH CV ECHO MEAS - CI(MOD-SP2): 1.5 L/MIN/M^2
BH CV ECHO MEAS - CI(MOD-SP4): 1.5 L/MIN/M^2
BH CV ECHO MEAS - CI(TEICH): 2.6 L/MIN/M^2
BH CV ECHO MEAS - CO(CUBED): 6 L/MIN
BH CV ECHO MEAS - CO(MOD-SP2): 2.7 L/MIN
BH CV ECHO MEAS - CO(MOD-SP4): 2.6 L/MIN
BH CV ECHO MEAS - CO(TEICH): 4.7 L/MIN
BH CV ECHO MEAS - CONTRAST EF (2CH): 56.9 ML/M^2
BH CV ECHO MEAS - CONTRAST EF 4CH: 45 ML/M^2
BH CV ECHO MEAS - EDV(CUBED): 139.7 ML
BH CV ECHO MEAS - EDV(MOD-SP2): 65 ML
BH CV ECHO MEAS - EDV(MOD-SP4): 80 ML
BH CV ECHO MEAS - EDV(TEICH): 128.9 ML
BH CV ECHO MEAS - EF(CUBED): 59.4 %
BH CV ECHO MEAS - EF(MOD-SP2): 56.9 %
BH CV ECHO MEAS - EF(MOD-SP4): 45 %
BH CV ECHO MEAS - EF(TEICH): 50.6 %
BH CV ECHO MEAS - ESV(CUBED): 56.8 ML
BH CV ECHO MEAS - ESV(MOD-SP2): 28 ML
BH CV ECHO MEAS - ESV(MOD-SP4): 44 ML
BH CV ECHO MEAS - ESV(TEICH): 63.7 ML
BH CV ECHO MEAS - FS: 25.9 %
BH CV ECHO MEAS - IVS/LVPW: 1.3
BH CV ECHO MEAS - IVSD: 0.87 CM
BH CV ECHO MEAS - LA DIMENSION: 3.5 CM
BH CV ECHO MEAS - LA/AO: 1.4
BH CV ECHO MEAS - LAT PEAK E' VEL: 6.7 CM/SEC
BH CV ECHO MEAS - LV DIASTOLIC VOL/BSA (35-75): 44.9 ML/M^2
BH CV ECHO MEAS - LV MASS(C)D: 136.9 GRAMS
BH CV ECHO MEAS - LV MASS(C)DI: 76.9 GRAMS/M^2
BH CV ECHO MEAS - LV MAX PG: 4.1 MMHG
BH CV ECHO MEAS - LV SYSTOLIC VOL/BSA (12-30): 24.7 ML/M^2
BH CV ECHO MEAS - LV V1 MAX: 100.9 CM/SEC
BH CV ECHO MEAS - LVIDD: 5.2 CM
BH CV ECHO MEAS - LVIDS: 3.8 CM
BH CV ECHO MEAS - LVLD AP2: 7.1 CM
BH CV ECHO MEAS - LVLD AP4: 8.2 CM
BH CV ECHO MEAS - LVLS AP2: 6.2 CM
BH CV ECHO MEAS - LVLS AP4: 7.1 CM
BH CV ECHO MEAS - LVOT AREA (M): 2.5 CM^2
BH CV ECHO MEAS - LVOT AREA: 2.6 CM^2
BH CV ECHO MEAS - LVOT DIAM: 1.8 CM
BH CV ECHO MEAS - LVPWD: 0.66 CM
BH CV ECHO MEAS - MED PEAK E' VEL: 5.46 CM/SEC
BH CV ECHO MEAS - MM HR: 72 BPM
BH CV ECHO MEAS - MM R-R INT: 0.83 SEC
BH CV ECHO MEAS - MV A MAX VEL: 114.7 CM/SEC
BH CV ECHO MEAS - MV DEC TIME: 0.29 SEC
BH CV ECHO MEAS - MV E MAX VEL: 63.9 CM/SEC
BH CV ECHO MEAS - MV E/A: 0.56
BH CV ECHO MEAS - PA ACC SLOPE: 635 CM/SEC^2
BH CV ECHO MEAS - PA ACC TIME: 0.1 SEC
BH CV ECHO MEAS - PA PR(ACCEL): 35 MMHG
BH CV ECHO MEAS - RVDD: 2.5 CM
BH CV ECHO MEAS - RVSP: 25 MMHG
BH CV ECHO MEAS - SI(CUBED): 46.6 ML/M^2
BH CV ECHO MEAS - SI(MOD-SP2): 20.8 ML/M^2
BH CV ECHO MEAS - SI(MOD-SP4): 20.2 ML/M^2
BH CV ECHO MEAS - SI(TEICH): 36.6 ML/M^2
BH CV ECHO MEAS - SV(CUBED): 82.9 ML
BH CV ECHO MEAS - SV(MOD-SP2): 37 ML
BH CV ECHO MEAS - SV(MOD-SP4): 36 ML
BH CV ECHO MEAS - SV(TEICH): 65.2 ML
BH CV ECHO MEAS - TAPSE (>1.6): 2 CM2
BH CV ECHO MEAS - TR MAX V: 22 MMHG
BH CV ECHO MEAS - TR MAX VEL: 225.3 CM/SEC
BH CV ECHO MEASUREMENTS AVERAGE E/E' RATIO: 10.51
BH CV VAS BP LEFT ARM: NORMAL MMHG
BH CV XLRA - RV BASE: 3.6 CM
BH CV XLRA - RV LENGTH: 7.3 CM
BH CV XLRA - RV MID: 3.3 CM
BH CV XLRA - TDI S': 10.5 CM/SEC
LEFT ATRIUM VOLUME INDEX: 16.9 ML/M2
LV EF 2D ECHO EST: 50 %

## 2018-05-01 PROCEDURE — 93306 TTE W/DOPPLER COMPLETE: CPT | Performed by: INTERNAL MEDICINE

## 2018-05-01 PROCEDURE — 93306 TTE W/DOPPLER COMPLETE: CPT

## 2018-05-07 ENCOUNTER — TELEPHONE (OUTPATIENT)
Dept: CARDIOLOGY | Facility: CLINIC | Age: 78
End: 2018-05-07

## 2018-05-07 NOTE — TELEPHONE ENCOUNTER
----- Message from Ishan Melchor MD sent at 5/7/2018 11:13 AM EDT -----  Please call her and tell her that the valve leakage in fine only mild. No new changes with meds            Patient notified and aware.

## 2018-05-17 RX ORDER — FUROSEMIDE 20 MG/1
20 TABLET ORAL AS NEEDED
Qty: 90 TABLET | Refills: 3 | Status: SHIPPED | OUTPATIENT
Start: 2018-05-17 | End: 2018-05-21 | Stop reason: SDUPTHER

## 2018-05-21 RX ORDER — FUROSEMIDE 20 MG/1
20 TABLET ORAL DAILY PRN
Qty: 90 TABLET | Refills: 3 | Status: SHIPPED | OUTPATIENT
Start: 2018-05-21 | End: 2019-04-17

## 2018-08-08 ENCOUNTER — CLINICAL SUPPORT NO REQUIREMENTS (OUTPATIENT)
Dept: CARDIOLOGY | Facility: CLINIC | Age: 78
End: 2018-08-08

## 2018-08-08 DIAGNOSIS — I44.2 COMPLETE HEART BLOCK (HCC): ICD-10-CM

## 2018-08-08 PROCEDURE — 93294 REM INTERROG EVL PM/LDLS PM: CPT | Performed by: INTERNAL MEDICINE

## 2018-08-08 PROCEDURE — 93296 REM INTERROG EVL PM/IDS: CPT | Performed by: INTERNAL MEDICINE

## 2018-11-13 ENCOUNTER — CLINICAL SUPPORT NO REQUIREMENTS (OUTPATIENT)
Dept: CARDIOLOGY | Facility: CLINIC | Age: 78
End: 2018-11-13

## 2018-11-13 DIAGNOSIS — I44.2 COMPLETE HEART BLOCK (HCC): Primary | ICD-10-CM

## 2018-11-13 PROCEDURE — 93294 REM INTERROG EVL PM/LDLS PM: CPT | Performed by: INTERNAL MEDICINE

## 2018-11-13 PROCEDURE — 93296 REM INTERROG EVL PM/IDS: CPT | Performed by: INTERNAL MEDICINE

## 2019-02-14 ENCOUNTER — CLINICAL SUPPORT NO REQUIREMENTS (OUTPATIENT)
Dept: CARDIOLOGY | Facility: CLINIC | Age: 79
End: 2019-02-14

## 2019-02-14 DIAGNOSIS — I44.2 COMPLETE HEART BLOCK (HCC): ICD-10-CM

## 2019-02-14 PROCEDURE — 93294 REM INTERROG EVL PM/LDLS PM: CPT | Performed by: INTERNAL MEDICINE

## 2019-02-14 PROCEDURE — 93296 REM INTERROG EVL PM/IDS: CPT | Performed by: INTERNAL MEDICINE

## 2019-04-17 ENCOUNTER — OFFICE VISIT (OUTPATIENT)
Dept: CARDIOLOGY | Facility: CLINIC | Age: 79
End: 2019-04-17

## 2019-04-17 VITALS
DIASTOLIC BLOOD PRESSURE: 74 MMHG | WEIGHT: 162.6 LBS | HEART RATE: 64 BPM | OXYGEN SATURATION: 94 % | HEIGHT: 61 IN | BODY MASS INDEX: 30.7 KG/M2 | SYSTOLIC BLOOD PRESSURE: 138 MMHG

## 2019-04-17 DIAGNOSIS — I44.2 COMPLETE HEART BLOCK (HCC): ICD-10-CM

## 2019-04-17 DIAGNOSIS — I10 ESSENTIAL HYPERTENSION: ICD-10-CM

## 2019-04-17 DIAGNOSIS — I38 VHD (VALVULAR HEART DISEASE): ICD-10-CM

## 2019-04-17 DIAGNOSIS — R60.0 BILATERAL LEG EDEMA: Primary | ICD-10-CM

## 2019-04-17 PROCEDURE — 99214 OFFICE O/P EST MOD 30 MIN: CPT | Performed by: INTERNAL MEDICINE

## 2019-04-17 PROCEDURE — 93280 PM DEVICE PROGR EVAL DUAL: CPT | Performed by: INTERNAL MEDICINE

## 2019-04-17 RX ORDER — HYDROCHLOROTHIAZIDE 25 MG/1
25 TABLET ORAL DAILY
Qty: 30 TABLET | Refills: 11 | Status: SHIPPED | OUTPATIENT
Start: 2019-04-17 | End: 2019-07-03 | Stop reason: SDUPTHER

## 2019-04-17 NOTE — PROGRESS NOTES
Veronicamadhu Whatley  1940  [phone]    04/17/2019    [facility] CARDIOLOGY     Matthew France MD  [address] [place] [address]    Chief Complaint   Patient presents with   • Complete heart block       Problem List:  1) CHB              A. Echocardiogram 1/20/17; LVEF 0.60, LV diastolic dysfunction grade 1A consistent with impaired relaxation, mild TR, moderate MR              B./ DDD PM implant 1/20/17 SJM   C. Echocardiogram 5/1/18: EF 50%, mild MR, mild TR, RVSP 25 mg Hg  2) HTN  3) HLP  4) Hypothyroidism  5) Chronic back/neck pain        Allergies  Allergies   Allergen Reactions   • Penicillins Rash     Rash and itching     • Cantaloupe Extract Allergy Skin Test    • Cat Hair Extract    • Chicken Allergy    • Dog Epithelium      hair   • Eggs Or Egg-Derived Products    • Grass    • Horse-Derived Products      Horse hair   • Peanuts [Peanut Oil]    • Restoril [Temazepam] Hallucinations   • Watermelon [Citrullus Vulgaris]    • Sulfa Antibiotics Rash       Current Medications    Current Outpatient Medications:   •  amitriptyline (ELAVIL) 50 MG tablet, Take 50 mg by mouth Every Night., Disp: , Rfl:   •  amLODIPine (NORVASC) 5 MG tablet, Take 1 tablet by mouth Daily., Disp: 30 tablet, Rfl: 6  •  aspirin 81 MG EC tablet, Take 81 mg by mouth Every Night., Disp: , Rfl:   •  furosemide (LASIX) 20 MG tablet, Take 1 tablet by mouth Daily As Needed (fluid retention)., Disp: 90 tablet, Rfl: 3  •  latanoprost (XALATAN) 0.005 % ophthalmic solution, Administer 1 drop to both eyes Every Night., Disp: , Rfl:   •  levothyroxine (SYNTHROID, LEVOTHROID) 88 MCG tablet, Take 88 mcg by mouth Every Night., Disp: , Rfl:   •  losartan (COZAAR) 100 MG tablet, Take 100 mg by mouth Daily., Disp: , Rfl:   •  metoprolol tartrate (LOPRESSOR) 25 MG tablet, TAKE 1 TABLET TWICE DAILY, Disp: 180 tablet, Rfl: 3  •  simvastatin (ZOCOR) 40 MG tablet, Take 40 mg by mouth Every Night., Disp: , Rfl:  "    History of Present Illness     Pt presents for follow up of HTN, VHD, AVB with PM check. Since we last saw the pt, pt denies any palpitations episodes, SOB, CP, LH, and dizziness, syncope. Denies any hospitalizations, ER visits, bleeding on ASA, or TIA/CVA symptoms. Overall feels well. BP is well controlled at home. Still complains of lower extremity edema. PRN lasix has not helped much. She has not been wearing DEJA hose.     ROS:  General:  Denies fatigue, weight gain + loss + normal appetite  Cardiovascular:  Denies CP, PND, syncope, near syncope, +edema - palpitations.  Pulmonary:  Denies DOLL, - cough, - wheezing - night sweats.      Vitals:    04/17/19 1257   BP: 138/74   BP Location: Right arm   Patient Position: Sitting   Pulse: 64   SpO2: 94%   Weight: 73.8 kg (162 lb 9.6 oz)   Height: 154.9 cm (61\")     Body mass index is 30.72 kg/m².  PE:  General: NAD. A & O x 3  Neck: no JVD, no carotid bruits, no TM  Heart RRR, NL S1, S2, S4 present, no rubs, murmurs  Lungs: CTA, no wheezes, rhonchi, or rales  Abd: soft, non-tender, NL BS  Ext: No musculoskeletal deformities, + 2  ankle edema, cyanosis, or clubbing  Psych: normal mood and affect    Diagnostic Data:    PM check: normal function. RV paced 97%. 10.8 years Less than 1% mode switched. (longest 12 hours)    Procedures    1. Bilateral leg edema    2. Complete heart block (CMS/HCC)    3. Essential hypertension    4. VHD (valvular heart disease)          Plan:  1. Edema:  Will Stop Norvasc and add HCTZ, also stop prn Lasix. BMP in one week.    2. HTN:  - well controlled on current medications, however, is having lower extremity edema. See # 1    2. AVB:  - PM with normal function    3. Mitral Regurgitation:  - mild on most recent echocardiogram 5/2018    F/up in 12 months    Scribed for Ishan Melchor MD by Milagro Armenta PA-C. 4/17/2019  1:53 PM     I, Ishan Melchor MD, personally performed the services described in this documentation as scribed by " the above named individual in my presence, and it is both accurate and complete.  4/17/2019  1:53 PM

## 2019-04-24 ENCOUNTER — RESULTS ENCOUNTER (OUTPATIENT)
Dept: CARDIOLOGY | Facility: CLINIC | Age: 79
End: 2019-04-24

## 2019-04-24 DIAGNOSIS — R60.0 BILATERAL LEG EDEMA: ICD-10-CM

## 2019-05-16 ENCOUNTER — CLINICAL SUPPORT NO REQUIREMENTS (OUTPATIENT)
Dept: CARDIOLOGY | Facility: CLINIC | Age: 79
End: 2019-05-16

## 2019-05-16 DIAGNOSIS — I44.2 COMPLETE HEART BLOCK (HCC): ICD-10-CM

## 2019-05-16 PROCEDURE — 93294 REM INTERROG EVL PM/LDLS PM: CPT | Performed by: INTERNAL MEDICINE

## 2019-05-16 PROCEDURE — 93296 REM INTERROG EVL PM/IDS: CPT | Performed by: INTERNAL MEDICINE

## 2019-06-20 ENCOUNTER — TELEPHONE (OUTPATIENT)
Dept: CARDIOLOGY | Facility: CLINIC | Age: 79
End: 2019-06-20

## 2019-06-20 NOTE — TELEPHONE ENCOUNTER
Patient called to ask if she is supposed to take her HCTZ everyday. I told her yes and verified that she stopped taking her lasix and Norvasc and she says that she did. I reviewed her labs that she had done previously after beginning the HCTZ. She reports that all swelling is gone and this seems to be working well.

## 2019-07-03 RX ORDER — HYDROCHLOROTHIAZIDE 25 MG/1
25 TABLET ORAL DAILY
Qty: 30 TABLET | Refills: 6 | Status: SHIPPED | OUTPATIENT
Start: 2019-07-03 | End: 2020-02-28 | Stop reason: SDUPTHER

## 2019-08-15 ENCOUNTER — CLINICAL SUPPORT NO REQUIREMENTS (OUTPATIENT)
Dept: CARDIOLOGY | Facility: CLINIC | Age: 79
End: 2019-08-15

## 2019-08-15 DIAGNOSIS — I44.2 COMPLETE HEART BLOCK (HCC): ICD-10-CM

## 2019-08-15 PROCEDURE — 93294 REM INTERROG EVL PM/LDLS PM: CPT | Performed by: INTERNAL MEDICINE

## 2019-08-15 PROCEDURE — 93296 REM INTERROG EVL PM/IDS: CPT | Performed by: INTERNAL MEDICINE

## 2019-11-14 ENCOUNTER — CLINICAL SUPPORT NO REQUIREMENTS (OUTPATIENT)
Dept: CARDIOLOGY | Facility: CLINIC | Age: 79
End: 2019-11-14

## 2019-11-14 DIAGNOSIS — I44.2 COMPLETE HEART BLOCK (HCC): Primary | ICD-10-CM

## 2019-11-14 PROCEDURE — 93296 REM INTERROG EVL PM/IDS: CPT | Performed by: INTERNAL MEDICINE

## 2019-11-14 PROCEDURE — 93294 REM INTERROG EVL PM/LDLS PM: CPT | Performed by: INTERNAL MEDICINE

## 2020-02-13 ENCOUNTER — CLINICAL SUPPORT NO REQUIREMENTS (OUTPATIENT)
Dept: CARDIOLOGY | Facility: CLINIC | Age: 80
End: 2020-02-13

## 2020-02-13 DIAGNOSIS — I44.2 COMPLETE HEART BLOCK (HCC): ICD-10-CM

## 2020-02-13 PROCEDURE — 93294 REM INTERROG EVL PM/LDLS PM: CPT | Performed by: INTERNAL MEDICINE

## 2020-02-13 PROCEDURE — 93296 REM INTERROG EVL PM/IDS: CPT | Performed by: INTERNAL MEDICINE

## 2020-02-28 RX ORDER — HYDROCHLOROTHIAZIDE 25 MG/1
25 TABLET ORAL DAILY
Qty: 90 TABLET | Refills: 3 | Status: SHIPPED | OUTPATIENT
Start: 2020-02-28 | End: 2021-03-03

## 2020-05-11 ENCOUNTER — OFFICE VISIT (OUTPATIENT)
Dept: CARDIOLOGY | Facility: CLINIC | Age: 80
End: 2020-05-11

## 2020-05-11 VITALS
WEIGHT: 152 LBS | HEART RATE: 67 BPM | DIASTOLIC BLOOD PRESSURE: 62 MMHG | SYSTOLIC BLOOD PRESSURE: 126 MMHG | HEIGHT: 61 IN | BODY MASS INDEX: 28.7 KG/M2

## 2020-05-11 DIAGNOSIS — I10 ESSENTIAL HYPERTENSION: Primary | ICD-10-CM

## 2020-05-11 DIAGNOSIS — I44.2 COMPLETE HEART BLOCK (HCC): ICD-10-CM

## 2020-05-11 DIAGNOSIS — I38 VHD (VALVULAR HEART DISEASE): ICD-10-CM

## 2020-05-11 PROCEDURE — 99441 PR PHYS/QHP TELEPHONE EVALUATION 5-10 MIN: CPT | Performed by: INTERNAL MEDICINE

## 2020-05-11 NOTE — PROGRESS NOTES
Veronica Whatley  1940  [phone]      05/11/2020      [facility] CARDIOLOGY     Matthew France MD  [address] [place] [address]    Chief Complaint   Patient presents with   • complete heart block   • Hypertension       Problem List:  1) CHB              A. Echocardiogram 1/20/17; LVEF 0.60, LV diastolic dysfunction grade 1A consistent with impaired  relaxation, mild TR, moderate MR              B./ DDD PM implant 1/20/17 SJM  2) VHD   A. Echocardiogram 1/20/17; LVEF 0.60, LV diastolic dysfunction grade 1A consistent with impaired  relaxation, mild TR, moderate MR   B. Echocardiogram 5/1/18: EF 50%, mild MR, mild TR, RVSP 25 mg Hg  3) HTN  4) HLP  5) Hypothyroidism  6) Chronic back/neck pain      Allergies  Allergies   Allergen Reactions   • Penicillins Rash     Rash and itching     • Cantaloupe Extract Allergy Skin Test    • Cat Hair Extract    • Cephalexin Other (See Comments)   • Chicken Allergy    • Dog Epithelium      hair   • Eggs Or Egg-Derived Products    • Grass    • Horse-Derived Products      Horse hair   • Peanuts [Peanut Oil]    • Restoril [Temazepam] Hallucinations   • Watermelon [Citrullus Vulgaris]    • Sulfa Antibiotics Rash       Current Medications    Current Outpatient Medications:   •  amitriptyline (ELAVIL) 50 MG tablet, Take 50 mg by mouth Every Night., Disp: , Rfl:   •  aspirin 81 MG EC tablet, Take 81 mg by mouth Every Night., Disp: , Rfl:   •  hydroCHLOROthiazide (HYDRODIURIL) 25 MG tablet, Take 1 tablet by mouth Daily., Disp: 90 tablet, Rfl: 3  •  latanoprost (XALATAN) 0.005 % ophthalmic solution, Administer 1 drop to both eyes Every Night., Disp: , Rfl:   •  levothyroxine (SYNTHROID, LEVOTHROID) 88 MCG tablet, Take 88 mcg by mouth Every Night., Disp: , Rfl:   •  losartan (COZAAR) 100 MG tablet, Take 100 mg by mouth Daily., Disp: , Rfl:   •  metoprolol tartrate (LOPRESSOR) 25 MG tablet, TAKE 1 TABLET TWICE DAILY, Disp: 180 tablet,  "Rfl: 3  •  simvastatin (ZOCOR) 40 MG tablet, Take 40 mg by mouth Every Night., Disp: , Rfl:     History of Present Illness     Pt presents for follow up of HTN/CHB/VHD. Since the pt has seen us in clinic last, pt denies any syncope, SOB, CP, LH, and dizziness. Denies any hospitalizations, ER visits, bleeding, or TIA/CVA symptoms. Overall feels well except for some mild fatigue.  Her lower extremity edema has completely resolved after discontinuation of her amlodipine in the past.  She states that her blood pressure generally runs approximately 120/60 and heart rate generally runs 60 to 70 bpm.  She has lost approximately 10 pounds intentionally over the past year.  There is been no near syncope or syncope.  She remains very active at home.    ROS:  General:  + fatigue, + 10 lb weight loss  Cardiovascular:  Denies CP, PND, syncope, near syncope, edema or palpitations.  Pulmonary:  Denies DOLL, cough, or wheezing    Vitals:    05/11/20 1051   BP: 126/62   BP Location: Left arm   Patient Position: Sitting   Pulse: 67   Weight: 68.9 kg (152 lb)   Height: 154.9 cm (61\")         Diagnostic Data:  Procedures    Remote pacemaker download 2/13/2020: 99% RV paced, no significant atrial arrhythmias.  10 years on the battery.    1. Essential hypertension    2. Complete heart block (CMS/HCC)    3. VHD (valvular heart disease)            Plan:     1. HTN:  - well controlled on current medications; continue exercise, weight loss, etc.     2. AVB:  - PM with normal function in February pacemaker download.  We will need to repeat pacemaker download this month     3. Mitral Regurgitation:  - mild on most recent echocardiogram 5/2018    You have chosen to receive care through a telephone visit. Do you consent to use a telephone visit for your medical care today? Yes    This visit has been rescheduled as a phone visit to comply with patient safety concerns in accordance with CDC recommendations. Total time of discussion/preparation was " 10 minutes.    F/up in 12 months

## 2021-03-03 RX ORDER — HYDROCHLOROTHIAZIDE 25 MG/1
TABLET ORAL
Qty: 90 TABLET | Refills: 3 | Status: SHIPPED | OUTPATIENT
Start: 2021-03-03 | End: 2021-04-19 | Stop reason: SDUPTHER

## 2021-04-19 RX ORDER — HYDROCHLOROTHIAZIDE 25 MG/1
25 TABLET ORAL DAILY
Qty: 90 TABLET | Refills: 1 | Status: SHIPPED | OUTPATIENT
Start: 2021-04-19 | End: 2021-10-21

## 2021-05-12 ENCOUNTER — OFFICE VISIT (OUTPATIENT)
Dept: CARDIOLOGY | Facility: CLINIC | Age: 81
End: 2021-05-12

## 2021-05-12 VITALS
DIASTOLIC BLOOD PRESSURE: 70 MMHG | BODY MASS INDEX: 30.82 KG/M2 | SYSTOLIC BLOOD PRESSURE: 126 MMHG | WEIGHT: 157 LBS | TEMPERATURE: 97.5 F | HEART RATE: 88 BPM | HEIGHT: 60 IN

## 2021-05-12 DIAGNOSIS — I38 VHD (VALVULAR HEART DISEASE): ICD-10-CM

## 2021-05-12 DIAGNOSIS — I10 ESSENTIAL HYPERTENSION: Primary | ICD-10-CM

## 2021-05-12 DIAGNOSIS — I44.2 COMPLETE HEART BLOCK (HCC): ICD-10-CM

## 2021-05-12 PROCEDURE — 99214 OFFICE O/P EST MOD 30 MIN: CPT | Performed by: INTERNAL MEDICINE

## 2021-05-12 PROCEDURE — 93280 PM DEVICE PROGR EVAL DUAL: CPT | Performed by: INTERNAL MEDICINE

## 2021-05-12 NOTE — PROGRESS NOTES
Veronica Whatley  1940  [phone]    05/12/2021    [facility] CARDIOLOGY     Referring Provider: No ref. provider found     Matthew France MD  [address] [place] [address]    Chief Complaint   Patient presents with   • Hypertension       Problem List:     1) CHB              A. Echocardiogram 1/20/17; LVEF 0.60, LV diastolic dysfunction grade 1A consistent with impaired        relaxation, mild TR, moderate MR              B./ DDD PM implant 1/20/17 [facility]  2) VHD              A. Echocardiogram 1/20/17; LVEF 0.60, LV diastolic dysfunction grade 1A consistent with impaired        relaxation, mild TR, moderate MR              B. Echocardiogram 5/1/18: EF 50%, mild MR, mild TR, RVSP 25 mg Hg  3) HTN  4) HLP  5) Hypothyroidism  6) Chronic back/neck pain    Allergies  Allergies   Allergen Reactions   • Penicillins Rash     Rash and itching     • Cantaloupe Extract Allergy Skin Test    • Cat Hair Extract    • Cephalexin Other (See Comments)   • Chicken Allergy    • Dog Epithelium      hair   • Eggs Or Egg-Derived Products    • Grass    • Horse-Derived Products      Horse hair   • Peanuts [Peanut Oil]    • Restoril [Temazepam] Hallucinations   • Watermelon [Citrullus Vulgaris]    • Sulfa Antibiotics Rash       Current Medications    Current Outpatient Medications:   •  amitriptyline (ELAVIL) 50 MG tablet, Take 50 mg by mouth Every Night. 1/2 tablet, Disp: , Rfl:   •  aspirin 81 MG EC tablet, Take 81 mg by mouth Every Night., Disp: , Rfl:   •  hydroCHLOROthiazide (HYDRODIURIL) 25 MG tablet, Take 1 tablet by mouth Daily., Disp: 90 tablet, Rfl: 1  •  latanoprost (XALATAN) 0.005 % ophthalmic solution, Administer 1 drop to both eyes Every Night., Disp: , Rfl:   •  levothyroxine (SYNTHROID, LEVOTHROID) 88 MCG tablet, Take 88 mcg by mouth Every Night., Disp: , Rfl:   •  losartan (COZAAR) 100 MG tablet, Take 100 mg by mouth Daily., Disp: , Rfl:   •  metoprolol tartrate (LOPRESSOR) 25 MG  "tablet, Take 1 tablet by mouth 2 (Two) Times a Day., Disp: 180 tablet, Rfl: 1  •  simvastatin (ZOCOR) 40 MG tablet, Take 40 mg by mouth Every Night., Disp: , Rfl:     History of Present Illness     Pt presents for follow up of HTN / CHB / VHD. Since we last saw the pt, patient denies any SOB, CP, LH, and dizziness. Some mild edema by end of the day. Denies any palps or racing sensations. Denies any hospitalizations, ER visits, bleeding issues on ASA, or TIA/CVA symptoms. BP well controlled at home. Did receive 1 dose of J&J vaccine. Overall feels well.    ROS:  General:  Denies fatigue, weight gain or loss  Cardiovascular:  Denies CP, PND, syncope, near syncope, +edema - palpitations.  Pulmonary:  Denies DOLL, cough, or wheezing      Vitals:    05/12/21 1132   BP: 126/70   BP Location: Left arm   Patient Position: Sitting   Pulse: 88   Temp: 97.5 °F (36.4 °C)   Weight: 71.2 kg (157 lb)   Height: 152.4 cm (60\")     Body mass index is 30.66 kg/m².  PE:  General: NAD  Neck: no JVD, no carotid bruits, no TM  Heart RRR, NL S1, S2, no rubs, + MR murmur  Lungs: CTA, no wheezes, rhonchi, or rales  Abd: soft, non-tender, NL BS  Ext: No musculoskeletal deformities, no edema, cyanosis, or clubbing  Psych: normal mood and affect    Diagnostic Data:  Manual Device Interrogation: [place], PPM, DDD, rate 50, Rap <1%, RVp > 99%, Underlying Rhythm AS/ @40, INSVT-7 beats, <1% AMS, longest 1 hr 28 min, 10.6 years on battery.     Procedures    1. Essential hypertension    2. Complete heart block (CMS/HCC)    3. VHD (valvular heart disease)        Plan:    1. HTN:  - well controlled on current medications; edema improved since being off amlodipine, continue exercise, weight loss, etc.     2. AVB:  - PM with normal Function, <1% AMS which appears to be PACs not true AF.      3. Mitral Regurgitation: Murmur increased in intensity  - mild on most recent echocardiogram 5/2018 with mild LV dysfxn  -Will order Echocardiogram to reassess VHD " and LVEF      Scribed for Ishan Melchor MD by MOOSE Michel. 5/12/2021 11:37 EDT     I, Ishan Melchor MD, personally performed the services described in this documentation as scribed by the above named individual in my presence, and it is both accurate and complete.  5/12/2021  11:54 EDT

## 2021-05-20 PROCEDURE — 93294 REM INTERROG EVL PM/LDLS PM: CPT | Performed by: INTERNAL MEDICINE

## 2021-05-20 PROCEDURE — 93296 REM INTERROG EVL PM/IDS: CPT | Performed by: INTERNAL MEDICINE

## 2021-08-19 PROCEDURE — 93296 REM INTERROG EVL PM/IDS: CPT | Performed by: INTERNAL MEDICINE

## 2021-08-19 PROCEDURE — 93294 REM INTERROG EVL PM/LDLS PM: CPT | Performed by: INTERNAL MEDICINE

## 2021-10-21 RX ORDER — HYDROCHLOROTHIAZIDE 25 MG/1
TABLET ORAL
Qty: 90 TABLET | Refills: 1 | Status: SHIPPED | OUTPATIENT
Start: 2021-10-21 | End: 2022-01-31 | Stop reason: SDUPTHER

## 2021-11-18 PROCEDURE — 93294 REM INTERROG EVL PM/LDLS PM: CPT | Performed by: INTERNAL MEDICINE

## 2021-11-18 PROCEDURE — 93296 REM INTERROG EVL PM/IDS: CPT | Performed by: INTERNAL MEDICINE

## 2021-11-22 ENCOUNTER — TELEPHONE (OUTPATIENT)
Dept: CARDIOLOGY | Facility: CLINIC | Age: 81
End: 2021-11-22

## 2022-01-31 RX ORDER — HYDROCHLOROTHIAZIDE 25 MG/1
25 TABLET ORAL DAILY
Qty: 90 TABLET | Refills: 3 | Status: SHIPPED | OUTPATIENT
Start: 2022-01-31 | End: 2023-01-24 | Stop reason: SDUPTHER

## 2022-02-17 PROCEDURE — 93294 REM INTERROG EVL PM/LDLS PM: CPT | Performed by: INTERNAL MEDICINE

## 2022-02-17 PROCEDURE — 93296 REM INTERROG EVL PM/IDS: CPT | Performed by: INTERNAL MEDICINE

## 2022-05-19 PROCEDURE — 93294 REM INTERROG EVL PM/LDLS PM: CPT | Performed by: INTERNAL MEDICINE

## 2022-05-19 PROCEDURE — 93296 REM INTERROG EVL PM/IDS: CPT | Performed by: INTERNAL MEDICINE

## 2022-05-25 ENCOUNTER — OFFICE VISIT (OUTPATIENT)
Dept: CARDIOLOGY | Facility: CLINIC | Age: 82
End: 2022-05-25

## 2022-05-25 VITALS
DIASTOLIC BLOOD PRESSURE: 80 MMHG | HEART RATE: 75 BPM | OXYGEN SATURATION: 94 % | WEIGHT: 158 LBS | SYSTOLIC BLOOD PRESSURE: 140 MMHG | HEIGHT: 60 IN | BODY MASS INDEX: 31.02 KG/M2

## 2022-05-25 DIAGNOSIS — I38 VHD (VALVULAR HEART DISEASE): Primary | ICD-10-CM

## 2022-05-25 DIAGNOSIS — I10 PRIMARY HYPERTENSION: ICD-10-CM

## 2022-05-25 DIAGNOSIS — I44.2 COMPLETE HEART BLOCK: ICD-10-CM

## 2022-05-25 PROCEDURE — 93280 PM DEVICE PROGR EVAL DUAL: CPT | Performed by: INTERNAL MEDICINE

## 2022-05-25 PROCEDURE — 99214 OFFICE O/P EST MOD 30 MIN: CPT | Performed by: INTERNAL MEDICINE

## 2022-05-25 RX ORDER — LEVOTHYROXINE SODIUM 75 MCG
75 TABLET ORAL DAILY
COMMUNITY
Start: 2022-05-12

## 2022-05-25 NOTE — PROGRESS NOTES
Veronica Whatley  1940  [phone]      05/25/2022      [facility] CARDIOLOGY     Matthew France MD  [address] [place] [address]    Chief Complaint   Patient presents with   • essential hypertension        Problem List:    1) CHB              A. Echocardiogram 1/20/17; LVEF 0.60, LV diastolic dysfunction grade 1A consistent with impaired        relaxation, mild TR, moderate MR              B./ DDD PM implant 1/20/17 [facility]  2) VHD              A. Echocardiogram 1/20/17; LVEF 0.60, LV diastolic dysfunction grade 1A consistent with impaired        relaxation, mild TR, moderate MR              B. Echocardiogram 5/1/18: EF 50%, mild MR, mild TR, RVSP 25 mg Hg  3) HTN  4) HLP  5) Hypothyroidism  6) Chronic back/neck pain    Allergies  Allergies   Allergen Reactions   • Penicillins Rash     Rash and itching     • Cantaloupe Extract Allergy Skin Test    • Cat Hair Extract    • Cephalexin Other (See Comments)   • Chicken Allergy    • Dog Epithelium      hair   • Eggs Or Egg-Derived Products    • Grass    • Horse-Derived Products      Horse hair   • Peanuts [Peanut Oil]    • Restoril [Temazepam] Hallucinations   • Watermelon [Citrullus Vulgaris]    • Sulfa Antibiotics Rash       Current Medications    Current Outpatient Medications:   •  amitriptyline (ELAVIL) 50 MG tablet, Take 50 mg by mouth Every Night. 1/2 tablet, Disp: , Rfl:   •  aspirin 81 MG EC tablet, Take 81 mg by mouth Every Night., Disp: , Rfl:   •  hydroCHLOROthiazide (HYDRODIURIL) 25 MG tablet, Take 1 tablet by mouth Daily., Disp: 90 tablet, Rfl: 3  •  latanoprost (XALATAN) 0.005 % ophthalmic solution, Administer 1 drop to both eyes Every Night., Disp: , Rfl:   •  losartan (COZAAR) 100 MG tablet, Take 100 mg by mouth Daily., Disp: , Rfl:   •  metoprolol tartrate (LOPRESSOR) 25 MG tablet, Take 1 tablet by mouth 2 (Two) Times a Day., Disp: 180 tablet, Rfl: 3  •  simvastatin (ZOCOR) 40 MG tablet, Take 40 mg by mouth  "Every Night., Disp: , Rfl:   •  Synthroid 75 MCG tablet, Take 75 mcg by mouth Daily., Disp: , Rfl:   •  levothyroxine (SYNTHROID, LEVOTHROID) 88 MCG tablet, Take 88 mcg by mouth Every Night., Disp: , Rfl:     History of Present Illness     Pt presents for follow up of CHB/VHD/HTN. Since the pt has seen us in clinic last, pt denies any syncope, SOB, CP, LH, and dizziness. Denies any hospitalizations, ER visits, bleeding, or TIA/CVA symptoms. Overall feels well. BP stable at home.     ROS:  General:  + fatigue, No weight gain or loss  Cardiovascular:  Denies CP, PND, syncope, near syncope, edema or palpitations.  Pulmonary:  Denies DOLL, cough, or wheezing    Vitals:    05/25/22 1036   BP: 140/80   BP Location: Left arm   Patient Position: Sitting   Pulse: 75   SpO2: 94%   Weight: 71.7 kg (158 lb)   Height: 152.4 cm (60\")       PE:  General: NAD  Neck: no JVD, no carotid bruits, no TM  Heart: RRR, NL S1, S2, S4 present, no rubs, + murmurs  Lungs: CTA, no wheezes, rhonchi, or rales  Abd: soft, non-tender, NL BS  Ext: No musculoskeletal deformities, + edema, No cyanosis, or clubbing, + varicose veins  Psych: normal mood and affect    Diagnostic Data:  Procedures      1. VHD (valvular heart disease)    2. Primary hypertension    3. Complete heart block (HCC)        PM Interrogation: NL PM fxn, Nl battery fxn, No AF, 99% RV paced,      Plan:    1. Mitral Regurgitation: needs echo to assess murmur and VHD    2. HTN:  - well controlled on current medications;      3. AVB:  - PM with normal Function, <1% AMS which appears to be PACs not true AF.          F/up in 12 months      "

## 2022-08-18 PROCEDURE — 93296 REM INTERROG EVL PM/IDS: CPT | Performed by: INTERNAL MEDICINE

## 2022-08-18 PROCEDURE — 93294 REM INTERROG EVL PM/LDLS PM: CPT | Performed by: INTERNAL MEDICINE

## 2022-08-29 ENCOUNTER — TELEPHONE (OUTPATIENT)
Dept: CARDIOLOGY | Facility: CLINIC | Age: 82
End: 2022-08-29

## 2022-09-12 ENCOUNTER — TELEPHONE (OUTPATIENT)
Dept: CARDIOLOGY | Facility: CLINIC | Age: 82
End: 2022-09-12

## 2022-09-12 NOTE — TELEPHONE ENCOUNTER
Per [employer] remote transmissions received today, 9/12/2022:     >>Ventricular lead noise noted on two EGMs from separate transmissions (9/10 & 9/12/2022). Report in [facility].     >>RV auto threshold elevated of 2.875V on 9/10/2022 transmission along with elevated RV output of 5V. RV pacing 99%.    Subsequent transmission from 9/12/2022 shows return to normal RV threshold of 0.75V & normal RV output of 1V.    >>Discussed w/HALEIGH Robertson of [employer]-next office follow up visit is 5/25/2023-will have pt come to the office in the near future to perform manual device interrogation.     Left voice mail message for pt to call the device clinic to schedule an in office device check.

## 2022-09-13 NOTE — TELEPHONE ENCOUNTER
I spoke w/pt, scheduled appointment for manual device interrogation tomorrow, 9/14/2022 @ 1 PM @ [place] office site.   _________________    Review of previous transmission from earlier in the year show known RV lead noise.

## 2022-09-14 ENCOUNTER — CLINICAL SUPPORT NO REQUIREMENTS (OUTPATIENT)
Dept: CARDIOLOGY | Facility: CLINIC | Age: 82
End: 2022-09-14

## 2022-09-14 DIAGNOSIS — I44.2 COMPLETE HEART BLOCK: Primary | ICD-10-CM

## 2022-09-14 NOTE — PROGRESS NOTES
Device checked- see scanned interrogation.  Auto cap turned off and RV sensitivity changed to 3 mV

## 2022-11-17 PROCEDURE — 93294 REM INTERROG EVL PM/LDLS PM: CPT | Performed by: INTERNAL MEDICINE

## 2022-11-17 PROCEDURE — 93296 REM INTERROG EVL PM/IDS: CPT | Performed by: INTERNAL MEDICINE

## 2023-01-24 RX ORDER — HYDROCHLOROTHIAZIDE 25 MG/1
25 TABLET ORAL DAILY
Qty: 90 TABLET | Refills: 2 | Status: SHIPPED | OUTPATIENT
Start: 2023-01-24 | End: 2023-02-27 | Stop reason: SDUPTHER

## 2023-02-16 PROCEDURE — 93296 REM INTERROG EVL PM/IDS: CPT | Performed by: INTERNAL MEDICINE

## 2023-02-16 PROCEDURE — 93294 REM INTERROG EVL PM/LDLS PM: CPT | Performed by: INTERNAL MEDICINE

## 2023-02-27 RX ORDER — HYDROCHLOROTHIAZIDE 25 MG/1
25 TABLET ORAL DAILY
Qty: 90 TABLET | Refills: 3 | Status: SHIPPED | OUTPATIENT
Start: 2023-02-27 | End: 2023-03-13 | Stop reason: SDUPTHER

## 2023-03-13 RX ORDER — HYDROCHLOROTHIAZIDE 25 MG/1
25 TABLET ORAL DAILY
Qty: 90 TABLET | Refills: 3 | Status: SHIPPED | OUTPATIENT
Start: 2023-03-13

## 2023-05-25 ENCOUNTER — OFFICE VISIT (OUTPATIENT)
Dept: CARDIOLOGY | Facility: CLINIC | Age: 83
End: 2023-05-25
Payer: MEDICARE

## 2023-05-25 VITALS
HEIGHT: 64 IN | DIASTOLIC BLOOD PRESSURE: 60 MMHG | BODY MASS INDEX: 26.4 KG/M2 | WEIGHT: 154.6 LBS | OXYGEN SATURATION: 99 % | HEART RATE: 75 BPM | SYSTOLIC BLOOD PRESSURE: 118 MMHG

## 2023-05-25 DIAGNOSIS — R60.9 SWELLING: ICD-10-CM

## 2023-05-25 DIAGNOSIS — I44.2 COMPLETE HEART BLOCK: Primary | ICD-10-CM

## 2023-05-25 DIAGNOSIS — R60.0 LOCALIZED EDEMA: ICD-10-CM

## 2023-05-25 DIAGNOSIS — E78.1 PURE HYPERTRIGLYCERIDEMIA: ICD-10-CM

## 2023-05-25 DIAGNOSIS — I38 VHD (VALVULAR HEART DISEASE): ICD-10-CM

## 2023-05-25 DIAGNOSIS — I10 PRIMARY HYPERTENSION: ICD-10-CM

## 2023-05-25 NOTE — PROGRESS NOTES
Veronica Whatley  1940  [phone]          [facility] CARDIOLOGY [facility]     Edilma, Matthew Hanley MD  [address] [place] [address]    Chief Complaint   Patient presents with   • VHD (valvular heart disease)     1 year follow up          Problem List:    1) CHB              A. Echocardiogram 1/20/17; LVEF 0.60, LV diastolic dysfunction grade 1A consistent with impaired        relaxation, mild TR, moderate MR              B./ DDD PM implant 1/20/17 [facility]  2) VHD              A. Echocardiogram 1/20/17; LVEF 0.60, LV diastolic dysfunction grade 1A consistent with impaired        relaxation, mild TR, moderate MR              B. Echocardiogram 5/1/18: EF 50%, mild MR, mild TR,   C. Echocardiogram 2022 Normal EF, Mild MR RVSP 25 mg Hg  3) HTN  4) HLP  5) Hypothyroidism  6) Chronic back/neck pain  7)Lower extremity Edema, Venous insuffiencey .    Allergies  Allergies   Allergen Reactions   • Penicillins Rash     Rash and itching     • Cantaloupe Extract Allergy Skin Test    • Cat Hair Extract    • Cephalexin Other (See Comments)   • Chicken Allergy    • Dog Epithelium      hair   • Eggs Or Egg-Derived Products    • Grass    • Horse-Derived Products      Horse hair   • Peanuts [Peanut Oil]    • Restoril [Temazepam] Hallucinations   • Watermelon [Citrullus Vulgaris]    • Sulfa Antibiotics Rash       Current Medications    Current Outpatient Medications:   •  amitriptyline (ELAVIL) 50 MG tablet, Take 1 tablet by mouth Every Night. 1/2 tablet, Disp: , Rfl:   •  aspirin 81 MG EC tablet, Take 1 tablet by mouth Every Night., Disp: , Rfl:   •  hydroCHLOROthiazide (HYDRODIURIL) 25 MG tablet, Take 1 tablet by mouth Daily., Disp: 90 tablet, Rfl: 3  •  latanoprost (XALATAN) 0.005 % ophthalmic solution, Administer 1 drop to both eyes Every Night., Disp: , Rfl:   •  losartan (COZAAR) 100 MG tablet, Take 1 tablet by mouth Daily., Disp: , Rfl:   •  metoprolol tartrate (LOPRESSOR) 25 MG tablet,  "Take 1 tablet by mouth 2 (Two) Times a Day., Disp: 180 tablet, Rfl: 2  •  simvastatin (ZOCOR) 40 MG tablet, Take 1 tablet by mouth Every Night., Disp: , Rfl:   •  Synthroid 75 MCG tablet, Take 1 tablet by mouth Daily., Disp: , Rfl:     History of Present Illness     Pt presents for follow up of CHB/VHD/HTN. Since the pt has seen us in clinic last, pt denies any syncope, SOB, CP, LH, and dizziness. Denies any hospitalizations, ER visits, bleeding, or TIA/CVA symptoms. Overall feels well. BP stable at home. She does have more swelling on her Lower legs. She is eating more salt and sitting a lot. She does not walk much because of her back, sclolosis and OA.     ROS:  General:  + fatigue, No weight gain or loss  Cardiovascular:  Denies CP, PND, syncope, near syncope, edema or palpitations.  Pulmonary:  Denies DOLL, cough, or wheezing    Vitals:    05/25/23 1304   BP: 118/60   BP Location: Right arm   Patient Position: Sitting   Cuff Size: Adult   Pulse: 75   SpO2: 99%   Weight: 70.1 kg (154 lb 9.6 oz)   Height: 162.6 cm (64\")       PE:  General: NAD  Neck: no JVD, no carotid bruits, no TM  Heart: RRR, NL S1, S2, S4 present, no rubs, + murmurs  Lungs: CTA, no wheezes, rhonchi, or rales  Abd: soft, non-tender, NL BS  Ext: No musculoskeletal deformities, + edema more left than right, No cyanosis, or clubbing, + varicose veins  Psych: normal mood and affect    Diagnostic Data:    ECG 12 Lead    Date/Time: 5/25/2023 1:35 PM  Performed by: Shankar Goddard PA  Authorized by: Shankar Goddard PA   Comparison: compared with previous ECG from 5/11/2020  Similar to previous ECG  Rhythm: sinus rhythm and paced  Rate: normal  Conduction: conduction normal  ST Segments: ST segments normal  T Waves: T waves normal  QRS axis: normal  Other: no other findings    Clinical impression: non-specific ECG              1. Complete heart block    2. VHD (valvular heart disease)    3. Primary hypertension    4. Pure hypertriglyceridemia  "       PM Interrogation: NL PM fxn, Nl battery fxn, No AF, 99% RV paced,      Plan:    1. Mitral Regurgitation: Echo with [place]ology, No MR, Normal EF 55%.     2. HTN:  - well controlled on current medications;      3. AVB:  - PM with normal Function, <1% AMS which appears to be PACs not true AF.      4. LE Edema: Worse on Left-will check U/s rule out DVT.     Check u/s on LE. Cut down on salt, compression stockings.   F/up in 12 months    Electronically signed by GM Jo, 05/25/23, 1:34 PM EDT.

## 2023-08-17 PROCEDURE — 93296 REM INTERROG EVL PM/IDS: CPT | Performed by: INTERNAL MEDICINE

## 2023-08-17 PROCEDURE — 93294 REM INTERROG EVL PM/LDLS PM: CPT | Performed by: INTERNAL MEDICINE

## 2024-02-08 ENCOUNTER — TELEPHONE (OUTPATIENT)
Dept: CARDIOLOGY | Facility: CLINIC | Age: 84
End: 2024-02-08
Payer: MEDICARE

## 2024-02-08 RX ORDER — HYDROCHLOROTHIAZIDE 25 MG/1
25 TABLET ORAL DAILY
Qty: 90 TABLET | Refills: 1 | Status: SHIPPED | OUTPATIENT
Start: 2024-02-08

## 2024-02-08 NOTE — TELEPHONE ENCOUNTER
Patient called requesting refill of HCTZ 25mg PO daily be sent to [facility]. Patient advised she will need f/u appt in May/June as she was last seen then. Would like to see Will Rupesh MONTENEGRO again. Transferred to scheduling to set up appt.

## 2024-04-24 ENCOUNTER — HOSPITAL ENCOUNTER (OUTPATIENT)
Dept: CARDIOLOGY | Facility: HOSPITAL | Age: 84
Discharge: HOME | End: 2024-04-24
Payer: MEDICARE

## 2024-04-24 VITALS — BODY MASS INDEX: 26.31 KG/M2 | HEIGHT: 64 IN | WEIGHT: 154.1 LBS

## 2024-04-24 VITALS — WEIGHT: 154 LBS | HEIGHT: 64 IN | BODY MASS INDEX: 26.29 KG/M2

## 2024-04-24 DIAGNOSIS — I38 VHD (VALVULAR HEART DISEASE): ICD-10-CM

## 2024-04-24 DIAGNOSIS — R60.0 LOCALIZED EDEMA: ICD-10-CM

## 2024-04-24 DIAGNOSIS — R60.9 SWELLING: ICD-10-CM

## 2024-04-24 DIAGNOSIS — I44.2 COMPLETE HEART BLOCK: ICD-10-CM

## 2024-04-24 DIAGNOSIS — E78.1 PURE HYPERTRIGLYCERIDEMIA: ICD-10-CM

## 2024-04-24 DIAGNOSIS — I10 PRIMARY HYPERTENSION: ICD-10-CM

## 2024-04-24 LAB
BH CV ECHO MEAS - AO MAX PG: 3.8 MMHG
BH CV ECHO MEAS - AO MEAN PG: 2 MMHG
BH CV ECHO MEAS - AO ROOT DIAM: 3.1 CM
BH CV ECHO MEAS - AO V2 MAX: 97 CM/SEC
BH CV ECHO MEAS - AO V2 VTI: 22.2 CM
BH CV ECHO MEAS - EF(MOD-BP): 52.9 %
BH CV ECHO MEAS - IVS/LVPW: 0.92 CM
BH CV ECHO MEAS - IVSD: 0.92 CM
BH CV ECHO MEAS - LA DIMENSION: 3.2 CM
BH CV ECHO MEAS - LAT PEAK E' VEL: 8.3 CM/SEC
BH CV ECHO MEAS - LV MAX PG: 2.19 MMHG
BH CV ECHO MEAS - LV MEAN PG: 1.3 MMHG
BH CV ECHO MEAS - LV V1 MAX: 74 CM/SEC
BH CV ECHO MEAS - LV V1 VTI: 20.4 CM
BH CV ECHO MEAS - LVIDD: 3.8 CM
BH CV ECHO MEAS - LVIDS: 3 CM
BH CV ECHO MEAS - LVOT DIAM: 2 CM
BH CV ECHO MEAS - LVPWD: 1 CM
BH CV ECHO MEAS - MED PEAK E' VEL: 6.37 CM/SEC
BH CV ECHO MEAS - MV A MAX VEL: 101 CM/SEC
BH CV ECHO MEAS - MV DEC SLOPE: 406 CM/SEC2
BH CV ECHO MEAS - MV E MAX VEL: 55 CM/SEC
BH CV ECHO MEAS - MV E/A: 0.54
BH CV ECHO MEAS - MV MAX PG: 4.2 MMHG
BH CV ECHO MEAS - MV MEAN PG: 2.2 MMHG
BH CV ECHO MEAS - MV V2 VTI: 23.6 CM
BH CV ECHO MEAS - PA ACC TIME: 0.06 SEC
BH CV ECHO MEAS - PA V2 MAX: 72 CM/SEC
BH CV ECHO MEAS - RAP SYSTOLE: 3 MMHG
BH CV ECHO MEAS - RV MAX PG: 1.35 MMHG
BH CV ECHO MEAS - RV V1 MAX: 58 CM/SEC
BH CV ECHO MEAS - RV V1 VTI: 13.6 CM
BH CV ECHO MEAS - RVSP: 27.2 MMHG
BH CV ECHO MEAS - TAPSE (>1.6): 2.37 CM
BH CV ECHO MEAS - TR MAX PG: 24.2 MMHG
BH CV ECHO MEAS - TR MAX VEL: 246 CM/SEC
BH CV ECHO MEASUREMENTS AVERAGE E/E' RATIO: 7.5
BH CV LOWER VASCULAR LEFT COMMON FEMORAL AUGMENT: NORMAL
BH CV LOWER VASCULAR LEFT COMMON FEMORAL COMPRESS: NORMAL
BH CV LOWER VASCULAR LEFT COMMON FEMORAL PHASIC: NORMAL
BH CV LOWER VASCULAR LEFT COMMON FEMORAL SPONT: NORMAL
BH CV LOWER VASCULAR LEFT DISTAL FEMORAL AUGMENT: NORMAL
BH CV LOWER VASCULAR LEFT DISTAL FEMORAL COMPRESS: NORMAL
BH CV LOWER VASCULAR LEFT DISTAL FEMORAL PHASIC: NORMAL
BH CV LOWER VASCULAR LEFT DISTAL FEMORAL SPONT: NORMAL
BH CV LOWER VASCULAR LEFT GASTRONEMIUS COMPRESS: NORMAL
BH CV LOWER VASCULAR LEFT GREATER SAPH AK COMPRESS: NORMAL
BH CV LOWER VASCULAR LEFT GREATER SAPH BK COMPRESS: NORMAL
BH CV LOWER VASCULAR LEFT LESSER SAPH COMPRESS: NORMAL
BH CV LOWER VASCULAR LEFT MID FEMORAL AUGMENT: NORMAL
BH CV LOWER VASCULAR LEFT MID FEMORAL COMPRESS: NORMAL
BH CV LOWER VASCULAR LEFT MID FEMORAL PHASIC: NORMAL
BH CV LOWER VASCULAR LEFT MID FEMORAL SPONT: NORMAL
BH CV LOWER VASCULAR LEFT PERONEAL AUGMENT: NORMAL
BH CV LOWER VASCULAR LEFT PERONEAL COMPRESS: NORMAL
BH CV LOWER VASCULAR LEFT POPLITEAL AUGMENT: NORMAL
BH CV LOWER VASCULAR LEFT POPLITEAL COMPRESS: NORMAL
BH CV LOWER VASCULAR LEFT POPLITEAL PHASIC: NORMAL
BH CV LOWER VASCULAR LEFT POPLITEAL SPONT: NORMAL
BH CV LOWER VASCULAR LEFT POSTERIOR TIBIAL AUGMENT: NORMAL
BH CV LOWER VASCULAR LEFT POSTERIOR TIBIAL COMPRESS: NORMAL
BH CV LOWER VASCULAR LEFT PROFUNDA FEMORAL AUGMENT: NORMAL
BH CV LOWER VASCULAR LEFT PROFUNDA FEMORAL PHASIC: NORMAL
BH CV LOWER VASCULAR LEFT PROFUNDA FEMORAL SPONT: NORMAL
BH CV LOWER VASCULAR LEFT PROXIMAL FEMORAL AUGMENT: NORMAL
BH CV LOWER VASCULAR LEFT PROXIMAL FEMORAL COMPRESS: NORMAL
BH CV LOWER VASCULAR LEFT PROXIMAL FEMORAL PHASIC: NORMAL
BH CV LOWER VASCULAR LEFT PROXIMAL FEMORAL SPONT: NORMAL
BH CV LOWER VASCULAR LEFT SAPHENOFEMORAL JUNCTION AUGMENT: NORMAL
BH CV LOWER VASCULAR LEFT SAPHENOFEMORAL JUNCTION COMPRESS: NORMAL
BH CV LOWER VASCULAR LEFT SAPHENOFEMORAL JUNCTION PHASIC: NORMAL
BH CV LOWER VASCULAR LEFT SAPHENOFEMORAL JUNCTION SPONT: NORMAL
BH CV LOWER VASCULAR RIGHT COMMON FEMORAL AUGMENT: NORMAL
BH CV LOWER VASCULAR RIGHT COMMON FEMORAL PHASIC: NORMAL
BH CV LOWER VASCULAR RIGHT COMMON FEMORAL SPONT: NORMAL
BH CV VAS BP RIGHT ARM: NORMAL MMHG
BH CV XLRA - RV BASE: 2.69 CM
BH CV XLRA - RV LENGTH: 5.54 CM
BH CV XLRA - RV MID: 1.94 CM
BH CV XLRA - TDI S': 12.34 CM/SEC
LEFT ATRIUM VOLUME INDEX: 13.9 ML/M2

## 2024-04-24 PROCEDURE — 93306 TTE W/DOPPLER COMPLETE: CPT

## 2024-04-24 PROCEDURE — 93971 EXTREMITY STUDY: CPT

## 2024-05-01 ENCOUNTER — TELEPHONE (OUTPATIENT)
Dept: CARDIOLOGY | Facility: CLINIC | Age: 84
End: 2024-05-01
Payer: MEDICARE

## 2024-05-01 NOTE — TELEPHONE ENCOUNTER
Called PT with results of recent ECHO,  per GM Rojas echo had normal results.    PT did not answer, left voicemail

## 2024-06-07 ENCOUNTER — OFFICE VISIT (OUTPATIENT)
Dept: CARDIOLOGY | Facility: CLINIC | Age: 84
End: 2024-06-07
Payer: MEDICARE

## 2024-06-07 VITALS
SYSTOLIC BLOOD PRESSURE: 138 MMHG | OXYGEN SATURATION: 95 % | HEART RATE: 75 BPM | WEIGHT: 154 LBS | DIASTOLIC BLOOD PRESSURE: 78 MMHG | BODY MASS INDEX: 30.23 KG/M2 | HEIGHT: 60 IN

## 2024-06-07 DIAGNOSIS — I44.2 COMPLETE HEART BLOCK: Primary | ICD-10-CM

## 2024-06-07 DIAGNOSIS — I10 PRIMARY HYPERTENSION: ICD-10-CM

## 2024-06-07 DIAGNOSIS — I38 VHD (VALVULAR HEART DISEASE): ICD-10-CM

## 2024-06-07 DIAGNOSIS — R60.0 BILATERAL LEG EDEMA: ICD-10-CM

## 2024-06-07 NOTE — PROGRESS NOTES
Veronica Whatley  1940  [phone]    06/07/2024    [facility] CARDIOLOGY     Referring Provider: No ref. provider found     Matthew France MD  [address] [place] [address]    Chief Complaint   Patient presents with    valvular heart disease       Problem List:   1) CHB              A. Echocardiogram 1/20/17; LVEF 0.60, LV diastolic dysfunction grade 1A consistent with impaired        relaxation, mild TR, moderate MR              B./ DDD PM implant 1/20/17 [facility]  2) VHD              A. Echocardiogram 1/20/17; LVEF 0.60, LV diastolic dysfunction grade 1A consistent with impaired        relaxation, mild TR, moderate MR              B. Echocardiogram 5/1/18: EF 50%, mild MR, mild TR,              C. Echocardiogram 2022 Normal EF, Mild MR RVSP 25 mg Hg   D.   3) HTN  4) HLP  5) Hypothyroidism  6) Chronic back/neck pain  7)Lower extremity Edema, Venous insuffiencey    Allergies  Allergies   Allergen Reactions    Penicillins Rash     Rash and itching      Cantaloupe Extract Allergy Skin Test     Cat Hair Extract     Cephalexin Other (See Comments)    Chicken Allergy     Dog Epithelium      hair    Egg-Derived Products     Grass     Horse-Derived Products      Horse hair    Peanuts [Peanut Oil]     Restoril [Temazepam] Hallucinations    Watermelon [Citrullus Vulgaris]     Sulfa Antibiotics Rash       Current Medications    Current Outpatient Medications:     aspirin 81 MG EC tablet, Take 1 tablet by mouth Every Night., Disp: , Rfl:     hydroCHLOROthiazide (HYDRODIURIL) 25 MG tablet, Take 1 tablet by mouth Daily., Disp: 90 tablet, Rfl: 1    latanoprost (XALATAN) 0.005 % ophthalmic solution, Administer 1 drop to both eyes Every Night., Disp: , Rfl:     losartan (COZAAR) 100 MG tablet, Take 1 tablet by mouth Daily., Disp: , Rfl:     metoprolol tartrate (LOPRESSOR) 25 MG tablet, TAKE 1 TABLET TWICE A DAY, Disp: 180 tablet, Rfl: 2    simvastatin (ZOCOR) 40 MG tablet, Take 1 tablet by  "mouth Every Night., Disp: , Rfl:     Synthroid 75 MCG tablet, Take 1 tablet by mouth Daily., Disp: , Rfl:     History of Present Illness     Pt presents for follow up of CHB with PM check, VHD, and HTN. Since we last saw the pt, pt denies any AF episodes, SOB, CP, LH, and dizziness. Denies any hospitalizations, ER visits, bleeding, or TIA/CVA symptoms. Overall feels well.  Blood pressures been well-controlled.  Has been compliant with her medical therapy.  Denies any near-syncope or syncope.  Recently had a basal cell carcinoma removed from her arm without issues.          Vitals:    06/07/24 1405   BP: 138/78   BP Location: Left arm   Patient Position: Sitting   Pulse: 75   SpO2: 95%   Weight: 69.9 kg (154 lb)   Height: 152.4 cm (60\")     Body mass index is 30.08 kg/m².  PE:  General: NAD  Neck: no JVD, no carotid bruits, no TM  Heart RRR, NL S1, S2, S4 present, no rubs, murmurs  Lungs: CTA, no wheezes, rhonchi, or rales  Abd: soft, non-tender, NL BS  Ext: No musculoskeletal deformities, no edema, cyanosis, or clubbing  Psych: normal mood and affect    Diagnostic Data:    DDD pacemaker interrogation demonstrates normal pacemaker function no significant atrial or ventricular arrhythmias.  Thresholds chronically stable.  Battery voltage nominal.  A few episodes of electrical noise noted on her RV lead which did result in transient in addition to pacing.  Isolate metrics were performed in the office today with no reproducibility noted.  The sensitivity was raised to 5 mV today.  She is paced 100% of time in the ventricle.  She is paced less than 1% in the atrium.      Procedures           1. Complete heart block    2. VHD (valvular heart disease)    3. Primary hypertension    4. Bilateral leg edema          Plan:    1. AVB/CHB:  - PM with normal Function, transient inhibition of RV pacing due to electrical noise assessed today.  Reprogramming of R wave sensitivity was performed.  Will continue to monitor remotely at " home.    2. Mitral Regurgitation: Echo with [place] cardiology, No MR, Normal EF 55%.      3. HTN:  - well controlled on current medications;      4. LE Edema: normal LE duplex 2023 ruled out DVT improved.      F/up in 12 months

## 2024-08-15 PROCEDURE — 93294 REM INTERROG EVL PM/LDLS PM: CPT | Performed by: INTERNAL MEDICINE

## 2024-08-15 PROCEDURE — 93296 REM INTERROG EVL PM/IDS: CPT | Performed by: INTERNAL MEDICINE

## 2024-09-04 ENCOUNTER — TELEPHONE (OUTPATIENT)
Dept: CARDIOLOGY | Facility: CLINIC | Age: 84
End: 2024-09-04
Payer: MEDICARE

## 2024-09-04 RX ORDER — HYDROCHLOROTHIAZIDE 25 MG/1
25 TABLET ORAL DAILY
Qty: 90 TABLET | Refills: 3 | Status: SHIPPED | OUTPATIENT
Start: 2024-09-04

## 2024-09-04 NOTE — TELEPHONE ENCOUNTER
Caller: Chacorta Veronica Cox    Relationship: Self    Best call back number: [phone] (home)      Requested Prescriptions:   Requested Prescriptions     Pending Prescriptions Disp Refills    hydroCHLOROthiazide 25 MG tablet 90 tablet 1     Sig: Take 1 tablet by mouth Daily.        Pharmacy where request should be sent:      Last office visit with prescribing clinician: 6/7/2024   Last telemedicine visit with prescribing clinician: Visit date not found   Next office visit with prescribing clinician: 6/11/2025     Additional details provided by patient: PT OUT    Does the patient have less than a 3 day supply:  [x] Yes  [] No    Would you like a call back once the refill request has been completed: [] Yes [x] No    If the office needs to give you a call back, can they leave a voicemail: [] Yes [x] No    Vivian Cortes Rep   09/04/24 11:23 EDT

## 2024-10-25 RX ORDER — METOPROLOL TARTRATE 25 MG/1
25 TABLET, FILM COATED ORAL 2 TIMES DAILY
Qty: 180 TABLET | Refills: 3 | Status: SHIPPED | OUTPATIENT
Start: 2024-10-25

## 2024-11-14 PROCEDURE — 93294 REM INTERROG EVL PM/LDLS PM: CPT | Performed by: INTERNAL MEDICINE

## 2024-11-14 PROCEDURE — 93296 REM INTERROG EVL PM/IDS: CPT | Performed by: INTERNAL MEDICINE

## 2025-02-13 PROCEDURE — 93294 REM INTERROG EVL PM/LDLS PM: CPT | Performed by: INTERNAL MEDICINE

## 2025-02-13 PROCEDURE — 93296 REM INTERROG EVL PM/IDS: CPT | Performed by: INTERNAL MEDICINE

## 2025-04-23 RX ORDER — METOPROLOL TARTRATE 25 MG/1
25 TABLET, FILM COATED ORAL 2 TIMES DAILY
Qty: 180 TABLET | Refills: 3 | Status: SHIPPED | OUTPATIENT
Start: 2025-04-23

## 2025-06-05 ENCOUNTER — TELEPHONE (OUTPATIENT)
Dept: CARDIOLOGY | Facility: CLINIC | Age: 85
End: 2025-06-05
Payer: MEDICARE

## 2025-06-05 NOTE — TELEPHONE ENCOUNTER
Spoke to patient to advise that her 6.11.25 appt would need to be adjusted. Patient was agreeable to 6.18.25 1:45pm with Milagro Armenta.    Patient requested mailed reminder. Reminder sent. Direct line ([phone])  provided to patient for any questions

## 2025-06-18 ENCOUNTER — OFFICE VISIT (OUTPATIENT)
Dept: CARDIOLOGY | Facility: CLINIC | Age: 85
End: 2025-06-18
Payer: MEDICARE

## 2025-06-18 VITALS
HEART RATE: 84 BPM | BODY MASS INDEX: 29.13 KG/M2 | OXYGEN SATURATION: 93 % | WEIGHT: 148.4 LBS | SYSTOLIC BLOOD PRESSURE: 136 MMHG | HEIGHT: 60 IN | DIASTOLIC BLOOD PRESSURE: 62 MMHG

## 2025-06-18 DIAGNOSIS — I38 VHD (VALVULAR HEART DISEASE): ICD-10-CM

## 2025-06-18 DIAGNOSIS — I44.2 COMPLETE HEART BLOCK: Primary | ICD-10-CM

## 2025-06-18 DIAGNOSIS — I10 PRIMARY HYPERTENSION: ICD-10-CM

## 2025-06-18 PROCEDURE — 93000 ELECTROCARDIOGRAM COMPLETE: CPT | Performed by: PHYSICIAN ASSISTANT

## 2025-06-18 PROCEDURE — 1159F MED LIST DOCD IN RCRD: CPT | Performed by: PHYSICIAN ASSISTANT

## 2025-06-18 PROCEDURE — 3078F DIAST BP <80 MM HG: CPT | Performed by: PHYSICIAN ASSISTANT

## 2025-06-18 PROCEDURE — 3075F SYST BP GE 130 - 139MM HG: CPT | Performed by: PHYSICIAN ASSISTANT

## 2025-06-18 PROCEDURE — 99214 OFFICE O/P EST MOD 30 MIN: CPT | Performed by: PHYSICIAN ASSISTANT

## 2025-06-18 PROCEDURE — 1160F RVW MEDS BY RX/DR IN RCRD: CPT | Performed by: PHYSICIAN ASSISTANT

## 2025-06-18 NOTE — PROGRESS NOTES
Veronica Whatley  1940  [phone]    06/18/2025    [facility] CARDIOLOGY     Referring Provider: No ref. provider found     Matthew France MD  [address] [place] [address]    Chief Complaint   Patient presents with    Complete heart block         Problem List:   1) CHB              A. Echocardiogram 1/20/17; LVEF 0.60, LV diastolic dysfunction grade 1A consistent with impaired        relaxation, mild TR, moderate MR              B./ DDD PM implant 1/20/17 SJM  2) VHD              A. Echocardiogram 1/20/17; LVEF 0.60, LV diastolic dysfunction grade 1A consistent with impaired        relaxation, mild TR, moderate MR              B. Echocardiogram 5/1/18: EF 50%, mild MR, mild TR,              C. Echocardiogram 2022 Normal EF, Mild MR RVSP 25 mg Hg              D.   3) HTN  4) HLP  5) Hypothyroidism  6) Chronic back/neck pain  7)Lower extremity Edema, Venous insuffiencey  8) basal cell removed from nasal area     Allergies  Allergies   Allergen Reactions    Penicillins Rash     Rash and itching      Cantaloupe Extract Allergy Skin Test     Cat Dander     Cephalexin Other (See Comments)    Chicken Allergy     Dog Epithelium      hair    Egg-Derived Products     Grass     Horse-Derived Products      Horse hair    Peanuts [Peanut Oil]     Restoril [Temazepam] Hallucinations    Watermelon [Citrullus Vulgaris]     Sulfa Antibiotics Rash       Current Medications    Current Outpatient Medications:     aspirin 81 MG EC tablet, Take 1 tablet by mouth Every Night., Disp: , Rfl:     hydroCHLOROthiazide 25 MG tablet, Take 1 tablet by mouth Daily., Disp: 90 tablet, Rfl: 3    losartan (COZAAR) 100 MG tablet, Take 1 tablet by mouth Daily., Disp: , Rfl:     metoprolol tartrate (LOPRESSOR) 25 MG tablet, Take 1 tablet by mouth 2 (Two) Times a Day., Disp: 180 tablet, Rfl: 3    simvastatin (ZOCOR) 40 MG tablet, Take 1 tablet by mouth Every Night., Disp: , Rfl:     Synthroid 75 MCG tablet,  "Take 1 tablet by mouth Daily., Disp: , Rfl:     History of Present Illness:     Pt presents for follow up of CHB with PM check, VHD, and HTN. Since we last saw the pt, pt denies any AF episodes, SOB, CP, LH, and dizziness, syncope.  Denies any hospitalizations, ER visits, bleeding, or TIA/CVA symptoms. Overall feels well.        Vitals:    06/18/25 1343   BP: 136/62   BP Location: Right arm   Patient Position: Sitting   Cuff Size: Adult   Pulse: 84   SpO2: 93%   Weight: 67.3 kg (148 lb 6.4 oz)   Height: 152.4 cm (60\")     Body mass index is 28.98 kg/m².  PE:  General: NAD  Neck: no JVD, no carotid bruits, no TM  Heart RRR, NL S1, S2, S4 present, no rubs, murmurs  Lungs: CTA, no wheezes, rhonchi, or rales  Abd: soft, non-tender, NL BS  Ext: No musculoskeletal deformities, no edema, cyanosis, or clubbing  Psych: normal mood and affect    Diagnostic Data:      DDD pacemaker interrogation demonstrates normal pacemaker function no significant atrial or ventricular arrhythmias. Thresholds chronically stable. Battery voltage 11 months         ECG 12 Lead    Date/Time: 6/18/2025 4:28 PM  Performed by: Milagro Armenta PA    Authorized by: Milagro Armenta PA  Comparison: compared with previous ECG from 5/23/2025  Similar to previous ECG  Rhythm: paced  BPM: 72          Advance Care Planning   ACP discussion was held with the patient during this visit. Patient has an advance directive (not in EMR), copy requested.       1. Complete heart block    2. VHD (valvular heart disease)    3. Primary hypertension          Plan:  1. AVB/CHB:  - PM with normal Function.  Reprogramming of R wave sensitivity was performed at last office visit for RV noise. None noted today.  Will continue to monitor remotely at home.  11 years on battery.      2. Mitral Regurgitation: Echo with [facility], No MR, Normal EF 55%.   - will get echo in 9 months at next follow up visit as she no longer follows with [facility].      3. " HTN:  - well controlled on current medications;      4. LE Edema: normal LE duplex 2023 ruled out DVT improved      F/up in 9 months with same day echo    Electronically signed by GM Okeefe, 06/18/25, 2:03 PM EDT.

## 2025-07-21 RX ORDER — METOPROLOL TARTRATE 25 MG/1
25 TABLET, FILM COATED ORAL 2 TIMES DAILY
Qty: 180 TABLET | Refills: 3 | Status: SHIPPED | OUTPATIENT
Start: 2025-07-21

## 2025-08-18 LAB
MDC_IDC_MSMT_BATTERY_REMAINING_LONGEVITY: 10 MO
MDC_IDC_MSMT_BATTERY_REMAINING_PERCENTAGE: 10 %
MDC_IDC_MSMT_BATTERY_RRT_TRIGGER: 2.6
MDC_IDC_MSMT_BATTERY_STATUS: NORMAL
MDC_IDC_MSMT_BATTERY_VOLTAGE: 2.83
MDC_IDC_MSMT_LEADCHNL_RA_DTM: NORMAL
MDC_IDC_MSMT_LEADCHNL_RA_IMPEDANCE_VALUE: 450
MDC_IDC_MSMT_LEADCHNL_RA_PACING_THRESHOLD_AMPLITUDE: 0.5
MDC_IDC_MSMT_LEADCHNL_RA_PACING_THRESHOLD_POLARITY: NORMAL
MDC_IDC_MSMT_LEADCHNL_RA_PACING_THRESHOLD_PULSEWIDTH: 0.5
MDC_IDC_MSMT_LEADCHNL_RA_SENSING_INTR_AMPL: 2.3
MDC_IDC_MSMT_LEADCHNL_RV_IMPEDANCE_VALUE: 380
MDC_IDC_MSMT_LEADCHNL_RV_PACING_THRESHOLD_POLARITY: NORMAL
MDC_IDC_MSMT_LEADCHNL_RV_SENSING_INTR_AMPL: 12
MDC_IDC_PG_IMPLANT_DTM: NORMAL
MDC_IDC_PG_MFG: NORMAL
MDC_IDC_PG_MODEL: NORMAL
MDC_IDC_PG_SERIAL: NORMAL
MDC_IDC_PG_TYPE: NORMAL
MDC_IDC_SESS_DTM: NORMAL
MDC_IDC_SESS_TYPE: NORMAL
MDC_IDC_SET_BRADY_AT_MODE_SWITCH_RATE: 160
MDC_IDC_SET_BRADY_LOWRATE: 50
MDC_IDC_SET_BRADY_MAX_SENSOR_RATE: 120
MDC_IDC_SET_BRADY_MAX_TRACKING_RATE: 120
MDC_IDC_SET_BRADY_MODE: NORMAL
MDC_IDC_SET_BRADY_PAV_DELAY: 150
MDC_IDC_SET_BRADY_SAV_DELAY: 130
MDC_IDC_SET_LEADCHNL_RA_PACING_AMPLITUDE: 2
MDC_IDC_SET_LEADCHNL_RA_PACING_POLARITY: NORMAL
MDC_IDC_SET_LEADCHNL_RA_PACING_PULSEWIDTH: 0.5
MDC_IDC_SET_LEADCHNL_RA_SENSING_POLARITY: NORMAL
MDC_IDC_SET_LEADCHNL_RA_SENSING_SENSITIVITY: 0.5
MDC_IDC_SET_LEADCHNL_RV_PACING_AMPLITUDE: 2.5
MDC_IDC_SET_LEADCHNL_RV_PACING_POLARITY: NORMAL
MDC_IDC_SET_LEADCHNL_RV_PACING_PULSEWIDTH: 0.5
MDC_IDC_SET_LEADCHNL_RV_SENSING_POLARITY: NORMAL
MDC_IDC_SET_LEADCHNL_RV_SENSING_SENSITIVITY: 5
MDC_IDC_STAT_AT_BURDEN_PERCENT: 1
MDC_IDC_STAT_BRADY_RA_PERCENT_PACED: 1
MDC_IDC_STAT_BRADY_RV_PERCENT_PACED: 99

## 2025-08-26 RX ORDER — HYDROCHLOROTHIAZIDE 25 MG/1
25 TABLET ORAL DAILY
Qty: 90 TABLET | Refills: 2 | Status: SHIPPED | OUTPATIENT
Start: 2025-08-26

## (undated) DEVICE — INTRO TEAR AWAY/LVD W/SD PRT 7F 13CM

## (undated) DEVICE — PENCL E/S HNDSWCH ROCKRBTN HOLSTR 10FT

## (undated) DEVICE — IRRIGATOR BULB ASEPTO 60CC STRL

## (undated) DEVICE — SET PRIMARY GRVTY 10DP MALE LL 104IN

## (undated) DEVICE — MEDI-VAC YANKAUER SUCTION HANDLE W/BULBOUS TIP: Brand: CARDINAL HEALTH

## (undated) DEVICE — LIMB HOLDERS: Brand: DEROYAL

## (undated) DEVICE — 3M™ STERI-STRIP™ REINFORCED ADHESIVE SKIN CLOSURES, R1547, 1/2 IN X 4 IN (12 MM X 100 MM), 6 STRIPS/ENVELOPE: Brand: 3M™ STERI-STRIP™

## (undated) DEVICE — ARM SLING II: Brand: DEROYAL

## (undated) DEVICE — ST EXT IV SMARTSITE 2VLV SP M LL 5ML IV1

## (undated) DEVICE — CANN NASL CO2 DIVIDED A/

## (undated) DEVICE — CAUTERY TIP POLISHER: Brand: DEVON

## (undated) DEVICE — DRSNG SURESITE123 4X4.8IN

## (undated) DEVICE — ADULT, W/LG. BACK PAD, RADIOTRANSPARENT ELEMENT AND LEAD WIRE: Brand: DEFIBRILLATION ELECTRODES

## (undated) DEVICE — ST INF PRI SMRTSTE 20DRP 2VLV 24ML 117

## (undated) DEVICE — TUBING, SUCTION, 1/4" X 10', STRAIGHT: Brand: MEDLINE

## (undated) DEVICE — SOL NACL 0.9PCT 1000ML

## (undated) DEVICE — DECANT BG O JET

## (undated) DEVICE — LEX ELECTRO PHYSIOLOGY: Brand: MEDLINE INDUSTRIES, INC.

## (undated) DEVICE — DECANTER: Brand: UNBRANDED